# Patient Record
Sex: FEMALE | Race: WHITE | NOT HISPANIC OR LATINO | Employment: OTHER | URBAN - METROPOLITAN AREA
[De-identification: names, ages, dates, MRNs, and addresses within clinical notes are randomized per-mention and may not be internally consistent; named-entity substitution may affect disease eponyms.]

---

## 2018-02-12 ENCOUNTER — EVALUATION (OUTPATIENT)
Dept: PHYSICAL THERAPY | Facility: CLINIC | Age: 53
End: 2018-02-12
Payer: COMMERCIAL

## 2018-02-12 DIAGNOSIS — M54.41 ACUTE BACK PAIN WITH SCIATICA, RIGHT: Primary | ICD-10-CM

## 2018-02-12 PROCEDURE — G8978 MOBILITY CURRENT STATUS: HCPCS

## 2018-02-12 PROCEDURE — 97162 PT EVAL MOD COMPLEX 30 MIN: CPT

## 2018-02-12 PROCEDURE — G8979 MOBILITY GOAL STATUS: HCPCS

## 2018-02-12 NOTE — PROGRESS NOTES
PT Evaluation     Today's date: 2018  Patient name: Ricky Ye  : 1965  MRN: [id number]  Referring provider: Simón Garcia MD  Dx:   Encounter Diagnosis   Name Primary?  Acute back pain with sciatica, right Yes                  Assessment  Impairments: abnormal gait, abnormal or restricted ROM, activity intolerance, impaired physical strength, lacks appropriate home exercise program and pain with function    Assessment details: Pt presents with chronic pain Right UE/LE, upper/lower spine as well as feeling of inflammation face and right hand  Reports CT scan revealed degenerative changes L-spine/pelvis and that follow up MRI ordered  Reports symptoms worsen with activity  Poor sleep noted  Pt will benefit from PT tx to decrease symptoms and improve functional level  Prognosis: good    Goals  ST  Decrease pain 50% 4 wk  2  Increase trunk ROM by 10-15 degree  4 wk  3  Increase trunk strength to Fair+  4 wk  4  Increase BLE strength by 1/2 MMT grade 4 wk  LT  Pt will report no pain 8 wk  2  Increase trunk ROM to WNL 8 wk  3  Increase trunk strength to Good 8 wk  4  Pt will report no limitations with ADL's 8 wk  5  Pt will report no limitations with ambulation 8 wk    Plan  Patient would benefit from: PT eval  Planned modality interventions: thermotherapy: hydrocollator packs, ultrasound, unattended electrical stimulation and cryotherapy  Planned therapy interventions: manual therapy, strengthening, stretching, therapeutic exercise, home exercise program, functional ROM exercises and flexibility  Frequency: 3x week  Duration in weeks: 8  Treatment plan discussed with: patient and family        Subjective Evaluation    History of Present Illness  Mechanism of injury: Reports right sided symptoms for the past 3+ years  Had C spine fusion approx 1 5 years ago  Reports pain entire right side of body  Reports  symptoms UE/LE and right side of back    Reports sensation dulled right side  Reports face and right hand feel "hot/inflammed" every day  Reports using cold towels to help with this  Had CT scan of L-spine and pelvis  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 L-spine  Reports mm atrophy or right gluteal region  Reports poor sleep  Hx injections to L-spine 2 years ago  Pt is scheduled to see pain management again for possible injections  Primary complaint presently is LBP/Right hip pain  Pt  helped with HPI/PMH due to pt speaking limited English  Pain  Current pain ratin  At best pain ratin  At worst pain ratin  Quality: radiating, burning, sharp and dull ache  Relieving factors: ice and medications    Hand dominance: right      Diagnostic Tests  CT scan: abnormal        Objective     Tenderness     Right Hip   Tenderness in the PSIS  Additional Tenderness Details  Tender to palpation Right Lumbar paraspinals, Right SI jt region, Right gluteal and proximal aspects of right anterior/lateral hip  Active Range of Motion     Lumbar   Flexion: WFL  Extension: 10 degrees with pain  Left lateral flexion: 10 degrees with pain  Right lateral flexion: WFL  Left rotation: WF  Right rotation: [facility]    Additional Active Range of Motion Details  Pain Right Lumbar region with extension and right lateral flexion    Strength/Myotome Testing     Left Hip   Planes of Motion   Flexion: 5  Extension: 5  Abduction: 5  Adduction: 5    Right Hip   Planes of Motion   Flexion: 4-  Extension: 4  Abduction: 4  Adduction: 4    Left Knee   Flexion: 5  Extension: 5    Right Knee   Flexion: 4+  Extension: 4+    Left Ankle/Foot   Dorsiflexion: 5  Plantar flexion: 5    Right Ankle/Foot   Dorsiflexion: 5  Plantar flexion: 5    Additional Strength Details  Pain right hip/low back with all resisted right hip movements  Fair trunk strength with resisted seated movements    Ambulation     Observational Gait     Additional Observational Gait Details  No significant gait abnormalities noted    Reports increased right low back/hip pain the more she ambulates        Precautions    Specialty Daily Treatment Diary     Manual         Stretch BLE                                            Exercise Diary         nustep        St hip flex/abd/ext        PPT        abd crunch        bridges        LTR        SKTC        Add sq        t-band hip abd        Mini squat with PPT        LTP/MTP with PPT                                                                                    Modalities        IFC and MHP        Ultrasound

## 2018-02-12 NOTE — LETTER
2018    Kristin Zarate MD  [address] [place] [id number]    Patient: Nathaniel Sweeney   YOB: 1965   Date of Visit: 2018       Dear Dr Yang Lights:    Please review the attached Plan of Care from [address] [place] [address] recent visit  Please verify that you agree therapy should continue by signing the attached document and sending it back to our office  If you have any questions or concerns, please don't hesitate to call  Sincerely,    [facility], PT      Referring Provider:      I certify that I have read the below Plan of Care and certify the need for these services furnished under this plan of treatment while under my care  Kristin Zarate MD  [address] [place] [address]  VIA Facsimile: [phone]          PT Evaluation     Today's date: 2018  Patient name: Nathaniel Sweeney  : 1965  MRN: [id number]  Referring provider: Argelia Gaines MD  Dx:   Encounter Diagnosis   Name Primary?  Acute back pain with sciatica, right Yes                  Assessment  Impairments: abnormal gait, abnormal or restricted ROM, activity intolerance, impaired physical strength, lacks appropriate home exercise program and pain with function    Assessment details: Pt presents with chronic pain Right UE/LE, upper/lower spine as well as feeling of inflammation face and right hand  Reports CT scan revealed degenerative changes L-spine/pelvis and that follow up MRI ordered  Reports symptoms worsen with activity  Poor sleep noted  Pt will benefit from PT tx to decrease symptoms and improve functional level  Prognosis: good    Goals  ST  Decrease pain 50% 4 wk  2  Increase trunk ROM by 10-15 degree  4 wk  3  Increase trunk strength to Fair+  4 wk  4  Increase BLE strength by 1/2 MMT grade 4 wk  LT  Pt will report no pain 8 wk  2  Increase trunk ROM to WNL 8 wk  3  Increase trunk strength to Good 8 wk  4    Pt will report no limitations with ADL's 8 wk  5  Pt will report no limitations with ambulation 8 wk    Plan  Patient would benefit from: PT eval  Planned modality interventions: thermotherapy: hydrocollator packs, ultrasound, unattended electrical stimulation and cryotherapy  Planned therapy interventions: manual therapy, strengthening, stretching, therapeutic exercise, home exercise program, functional ROM exercises and flexibility  Frequency: 3x week  Duration in weeks: 8  Treatment plan discussed with: patient and family        Subjective Evaluation    History of Present Illness  Mechanism of injury: Reports right sided symptoms for the past 3+ years  Had C spine fusion approx 1 5 years ago  Reports pain entire right side of body  Reports  symptoms UE/LE and right side of back  Reports sensation dulled right side  Reports face and right hand feel "hot/inflammed" every day  Reports using cold towels to help with this  Had CT scan of L-spine and pelvis  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 L-spine  Reports mm atrophy or right gluteal region  Reports poor sleep  Hx injections to L-spine 2 years ago  Pt is scheduled to see pain management again for possible injections  Primary complaint presently is LBP/Right hip pain  Pt  helped with HPI/PMH due to pt speaking limited English  Pain  Current pain ratin  At best pain ratin  At worst pain ratin  Quality: radiating, burning, sharp and dull ache  Relieving factors: ice and medications    Hand dominance: right      Diagnostic Tests  CT scan: abnormal        Objective     Tenderness     Right Hip   Tenderness in the PSIS  Additional Tenderness Details  Tender to palpation Right Lumbar paraspinals, Right SI jt region, Right gluteal and proximal aspects of right anterior/lateral hip        Active Range of Motion     Lumbar   Flexion: WFL  Extension: 10 degrees with pain  Left lateral flexion: 10 degrees with pain  Right lateral flexion: WFL  Left rotation: [facility] rotation: [facility]    Additional Active Range of Motion Details  Pain Right Lumbar region with extension and right lateral flexion    Strength/Myotome Testing     Left Hip   Planes of Motion   Flexion: 5  Extension: 5  Abduction: 5  Adduction: 5    Right Hip   Planes of Motion   Flexion: 4-  Extension: 4  Abduction: 4  Adduction: 4    Left Knee   Flexion: 5  Extension: 5    Right Knee   Flexion: 4+  Extension: 4+    Left Ankle/Foot   Dorsiflexion: 5  Plantar flexion: 5    Right Ankle/Foot   Dorsiflexion: 5  Plantar flexion: 5    Additional Strength Details  Pain right hip/low back with all resisted right hip movements  Fair trunk strength with resisted seated movements    Ambulation     Observational Gait     Additional Observational Gait Details  No significant gait abnormalities noted  Reports increased right low back/hip pain the more she ambulates        Precautions    Specialty Daily Treatment Diary     Manual         Stretch BLE                                            Exercise Diary         nustep        St hip flex/abd/ext        PPT        abd crunch        bridges        LTR        SKTC        Add sq        t-band hip abd        Mini squat with PPT        LTP/MTP with PPT                                                                                    Modalities        IFC and MHP        Ultrasound

## 2018-02-13 ENCOUNTER — OFFICE VISIT (OUTPATIENT)
Dept: PHYSICAL THERAPY | Facility: CLINIC | Age: 53
End: 2018-02-13
Payer: COMMERCIAL

## 2018-02-13 ENCOUNTER — TRANSCRIBE ORDERS (OUTPATIENT)
Dept: PHYSICAL THERAPY | Facility: CLINIC | Age: 53
End: 2018-02-13

## 2018-02-13 DIAGNOSIS — M54.31 SCIATICA OF RIGHT SIDE: Primary | ICD-10-CM

## 2018-02-13 DIAGNOSIS — M54.41 ACUTE BACK PAIN WITH SCIATICA, RIGHT: Primary | ICD-10-CM

## 2018-02-13 PROCEDURE — 97014 ELECTRIC STIMULATION THERAPY: CPT

## 2018-02-13 PROCEDURE — 97110 THERAPEUTIC EXERCISES: CPT

## 2018-02-13 PROCEDURE — 97140 MANUAL THERAPY 1/> REGIONS: CPT

## 2018-02-13 NOTE — PROGRESS NOTES
Daily Note     Today's date: 2018  Patient name: Bijal Mitchell  : 1965  MRN: [id number]  Referring provider: Franchesca Hutson MD  Dx:   Encounter Diagnosis   Name Primary?  Acute back pain with sciatica, right Yes              Subjective:  I have pain from my LB to R hip and R leg  Objective: See treatment diary below    Assessment: Pt with good teddy to initiation of TE as per flow sheet  Pt did reports some LB and R hip discomfort with bridges  B LE tightness noted all planes and only able to teddy gentle stretch  Pt did report reduced pain at end with IFC/MHP to LB and R hip in L side lying  Plan: Continue per plan of care  and Progress treatment as tolerated        Precautions     Specialty Daily Treatment Diary      Manual   18           Stretch BLE  10"                               Exercise Diary   18           nustep  L 1 10'           TR/HR 1 x 10       St hip flex/abd/ext Juan R 1 x 10 ea           PPT 1 x 10           abd crunch  1 x 10           bridges  1 x 10           LTR  1 x 10           SKTC             Add sq  1 x 10           t-band hip abd  red 1 x 10           Mini squat with PPT  NV           LTP/MTP with PPT  NV                                            Modalities  18          IFC and MHP  15'           Ultrasound

## 2018-02-19 ENCOUNTER — OFFICE VISIT (OUTPATIENT)
Dept: PHYSICAL THERAPY | Facility: CLINIC | Age: 53
End: 2018-02-19
Payer: COMMERCIAL

## 2018-02-19 DIAGNOSIS — M54.41 ACUTE BACK PAIN WITH SCIATICA, RIGHT: Primary | ICD-10-CM

## 2018-02-19 PROCEDURE — 97014 ELECTRIC STIMULATION THERAPY: CPT | Performed by: PHYSICAL THERAPIST

## 2018-02-19 PROCEDURE — 97110 THERAPEUTIC EXERCISES: CPT

## 2018-02-19 PROCEDURE — 97140 MANUAL THERAPY 1/> REGIONS: CPT

## 2018-02-19 NOTE — PROGRESS NOTES
Daily Note     Today's date: 2018  Patient name: Cory Guillen  : 1965  MRN: [id number]  Referring provider: Brody Baldwin MD  Dx:   Encounter Diagnosis     ICD-10-CM    1  Acute back pain with sciatica, right M54 41                   Subjective: Patient reports "a little bit" of pain into the leg, but it is very minimal       Objective: See treatment diary below  Manual   18         Stretch BLE  10"  10'                             Exercise Diary   18         nustep  L 1 10' L1 10'          TR/HR 1 x 10  10x ea         St hip flex/abd/ext Juan R 1 x 10 ea B/L 10x ea          PPT 1 x 10 10x          abd crunch  1 x 10 10x         bridges  1 x 10 10x          LTR  1 x 10 10x          SKTC             Add sq  1 x 10 10x          t-band hip abd  red 1 x 10 Red 10x         Mini squat with PPT  NV 10x          LTP/MTP with PPT  NV  NV                                           Modalities  18         IFC and P  15'  15'         Ultrasound                   Assessment: Tolerated treatment well  Patient exhibited good technique with therapeutic exercises, would benefit from continued PT and no pain noted with exercises  Progress repititions next visit as tolerated by patient  Plan: Continue per plan of care

## 2018-02-20 ENCOUNTER — OFFICE VISIT (OUTPATIENT)
Dept: PHYSICAL THERAPY | Facility: CLINIC | Age: 53
End: 2018-02-20
Payer: COMMERCIAL

## 2018-02-20 DIAGNOSIS — M54.41 ACUTE BACK PAIN WITH SCIATICA, RIGHT: Primary | ICD-10-CM

## 2018-02-20 PROCEDURE — 97110 THERAPEUTIC EXERCISES: CPT

## 2018-02-20 PROCEDURE — 97140 MANUAL THERAPY 1/> REGIONS: CPT

## 2018-02-20 PROCEDURE — 97014 ELECTRIC STIMULATION THERAPY: CPT

## 2018-02-20 NOTE — PROGRESS NOTES
Daily Note     Today's date: 2018  Patient name: Alejandra Valenzuela  : 1965  MRN: [id number]  Referring provider: Praveen Michele MD  Dx:   Encounter Diagnosis     ICD-10-CM    1  Acute back pain with sciatica, right M54 41               Subjective: I was sore after last visit for a while  Better today  Objective: See treatment diary below      Assessment: Tolerated treatment well  She did report R lateral discomfort and clicking sensation with wt bearing ex and R piriformis stretch  She denied increased pain t/o Tx  Plan: Continue per plan of care  Progress treatment as tolerated        Manual   18       Stretch BLE  10"  10'  10'                           Exercise Diary   18       nustep  L 1 10' L1 10'   L 2 10'       TR/HR 1 x 10  10x ea  x 10       St hip flex/abd/ext Juan R 1 x 10 ea B/L 10x ea   BLE x 10 ea       PPT 1 x 10 10x   x 15       abd crunch  1 x 10 10x  x 15       bridges  1 x 10 10x   x 15       LTR  1 x 10 10x   x 15       SKTC             Add sq  1 x 10 10x   x 15       t-band hip abd  red 1 x 10 Red 10x Green x 15       Mini squat with PPT  NV 10x   x 10       LTP/MTP with PPT  NV  NV  NV                                         Modalities  18       IFC and MHP  15'  15'  15'       Ultrasound

## 2018-02-26 ENCOUNTER — OFFICE VISIT (OUTPATIENT)
Dept: PHYSICAL THERAPY | Facility: CLINIC | Age: 53
End: 2018-02-26
Payer: COMMERCIAL

## 2018-02-26 DIAGNOSIS — M54.41 ACUTE BACK PAIN WITH SCIATICA, RIGHT: Primary | ICD-10-CM

## 2018-02-26 PROCEDURE — 97110 THERAPEUTIC EXERCISES: CPT

## 2018-02-26 PROCEDURE — 97140 MANUAL THERAPY 1/> REGIONS: CPT

## 2018-02-26 PROCEDURE — 97014 ELECTRIC STIMULATION THERAPY: CPT

## 2018-02-26 NOTE — PROGRESS NOTES
Daily Note     Today's date: 2018  Patient name: Donaldo Ames  : 1965  MRN: [id number]  Referring provider: Jennifer Harris MD  Dx:   Encounter Diagnosis     ICD-10-CM    1  Acute back pain with sciatica, right M54 41                   Subjective: I'm feeling a little better      Objective: See treatment diary below  Pain 4/10 pre-tx  Pain 2/10 post tx  Assessment: Tolerated treatment well  Reports symptoms primarily right low back and lower extremity  Reports decreasing pain with PT tx  Patient would benefit from continued PT      Plan: Continue per plan of care        Precautions:    Specialty Daily Treatment Diary      Manual   18     Stretch BLE  10"  10'  10' 10'                         Exercise Diary   18     nustep  L 1 10' L1 10'   L 2 10' L3 10'     TR/HR 1 x 10  10x ea  x 10       St hip flex/abd/ext Juan R 1 x 10 ea B/L 10x ea   BLE x 10 ea       PPT 1 x 10 10x   x 15  20x     abd crunch  1 x 10 10x  x 15  20x     bridges  1 x 10 10x   x 15  20x     LTR  1 x 10 10x   x 15  20x     SKTC             Add sq  1 x 10 10x   x 15  20x     t-band hip abd  red 1 x 10 Red 10x Green x 15  blue 20x     Mini squat with PPT  NV 10x   x 10       LTP/MTP with PPT  NV  NV  NV                                         Modalities  18     IFC and MHP  15'  15'  15'  15'     Ultrasound

## 2018-02-27 ENCOUNTER — OFFICE VISIT (OUTPATIENT)
Dept: PHYSICAL THERAPY | Facility: CLINIC | Age: 53
End: 2018-02-27
Payer: COMMERCIAL

## 2018-02-27 DIAGNOSIS — M54.41 ACUTE BACK PAIN WITH SCIATICA, RIGHT: Primary | ICD-10-CM

## 2018-02-27 PROCEDURE — 97014 ELECTRIC STIMULATION THERAPY: CPT

## 2018-02-27 PROCEDURE — 97140 MANUAL THERAPY 1/> REGIONS: CPT

## 2018-02-27 PROCEDURE — 97110 THERAPEUTIC EXERCISES: CPT

## 2018-02-27 NOTE — PROGRESS NOTES
Daily Note     Today's date: 2018  Patient name: Tea Villalobos  : 1965  MRN: [id number]  Referring provider: Nolvia Cash MD  Dx:   Encounter Diagnosis     ICD-10-CM    1  Acute back pain with sciatica, right M54 41                   Subjective: The patient states that she is a little sore after the stretching yesterday  Objective: See treatment diary below      Assessment: Tolerated treatment well  Patient exhibited good technique with therapeutic exercises and would benefit from continued PT  The patient was tight in her B LE  Plan: Continue per plan of care       Precautions:     Specialty Daily Treatment Diary        Manual   18   Stretch BLE  10"  10'  10' 10'                         Exercise Diary   18  1-35-05  18  1-93-61 18   nustep  L 1 10' L1 10'   L 2 10' L3 10' L3 10'   TR/HR 1 x 10  10x ea  x 10   30x   St hip flex/abd/ext Juan R 1 x 10 ea B/L 10x ea   BLE x 10 ea   3x10    PPT 1 x 10 10x   x 15  20x  20x   abd crunch  1 x 10 10x  x 15  20x  20x   bridges  1 x 10 10x   x 15  20x  20x   LTR  1 x 10 10x   x 15  20x  20x   SKTC          20x   Add sq  1 x 10 10x   x 15  20x  :05 20x   t-band hip abd  red 1 x 10 Red 10x Green x 15  blue 20x  blue 20x   Mini squat with PPT  NV 10x   x 10       LTP/MTP with PPT  NV  NV  NV                                         Modalities  18   IFC and MHP  15'  15'  15'  15'  15'   Ultrasound

## 2018-03-12 ENCOUNTER — OFFICE VISIT (OUTPATIENT)
Dept: PHYSICAL THERAPY | Facility: CLINIC | Age: 53
End: 2018-03-12
Payer: COMMERCIAL

## 2018-03-12 DIAGNOSIS — M54.41 ACUTE BACK PAIN WITH SCIATICA, RIGHT: Primary | ICD-10-CM

## 2018-03-12 PROCEDURE — 97014 ELECTRIC STIMULATION THERAPY: CPT

## 2018-03-12 PROCEDURE — 97110 THERAPEUTIC EXERCISES: CPT

## 2018-03-12 PROCEDURE — 97140 MANUAL THERAPY 1/> REGIONS: CPT

## 2018-03-12 NOTE — PROGRESS NOTES
Daily Note     Today's date: 3/12/2018  Patient name: Belkys Mulligan  : 1965  MRN: [id number]  Referring provider: Shantell Salas MD  Dx:   Encounter Diagnosis     ICD-10-CM    1  Acute back pain with sciatica, right M54 41                   Subjective: The patient states that she is doing better  Objective: See treatment diary below      Assessment: Tolerated treatment well  Patient exhibited good technique with therapeutic exercises and would benefit from continued PT  The patient had good ROM in her B Le  Continue to work on increasing strength and ROM          Plan: Continue per plan of care     Precautions:     Specialty Daily Treatment Diary        Manual  3/12/18  2-19-18  2-20-18  2-26-18  2-27-18   Stretch BLE  15'  10'  10' 10'                         Exercise Diary  3/12/18  2-19-18  2-20-18  2-26-18 2/27/18   nustep  L 3 10' L1 10'   L 2 10' L3 10' L3 10'   TR/HR 3 x 10  10x ea  x 10   30x   St hip flex/abd/ext Juan R 3 x 10 B/L 10x ea   BLE x 10 ea   3x10    PPT 2 x 10 10x   x 15  20x  20x   abd crunch  2 x 10 10x  x 15  20x  20x   bridges  2 x 10 10x   x 15  20x  20x   LTR  2 x 10 10x   x 15  20x  20x   SKTC          20x   Add sq  2x 10 10x   x 15  20x  :05 20x   t-band hip abd  Blue 1 x 10 Red 10x Green x 15  blue 20x  blue 20x   Mini squat with PPT  NV 10x   x 10       LTP/MTP with PPT  NV  NV  NV                                         Modalities 3/12/18  2-19-18  2-20-18  2-26-18 2/27/18   IFC and MHP  15'  15'  15'  15'  15'   Ultrasound

## 2018-03-13 ENCOUNTER — OFFICE VISIT (OUTPATIENT)
Dept: PHYSICAL THERAPY | Facility: CLINIC | Age: 53
End: 2018-03-13
Payer: COMMERCIAL

## 2018-03-13 DIAGNOSIS — M54.41 ACUTE BACK PAIN WITH SCIATICA, RIGHT: Primary | ICD-10-CM

## 2018-03-13 PROCEDURE — 97140 MANUAL THERAPY 1/> REGIONS: CPT

## 2018-03-13 PROCEDURE — 97110 THERAPEUTIC EXERCISES: CPT

## 2018-03-13 PROCEDURE — 97014 ELECTRIC STIMULATION THERAPY: CPT

## 2018-03-13 NOTE — PROGRESS NOTES
Daily Note     Today's date: 3/13/2018  Patient name: Alonso Gar  : 1965  MRN: [id number]  Referring provider: Patricia García MD  Dx:   Encounter Diagnosis     ICD-10-CM    1  Acute back pain with sciatica, right M54 41                   Subjective: Patient reports that she is doing alright today  Objective: See treatment diary below      Assessment: Tolerated treatment well  Patient exhibited good technique with therapeutic exercises and would benefit from continued PT  The patient did well with resistive exercises  Patient educated in proper self stretches  Plan: Continue per plan of care       Precautions:     Specialty Daily Treatment Diary        Manual  3/12/18 3/13/18      Stretch BLE  15'  10'                          Exercise Diary  3/12/18 3/13/18      nustep  L 3 10' L3 10'       TR/HR 3 x 10       St hip flex/abd/ext Juan R 3 x 10       PPT 2 x 10 10x       abd crunch  2 x 10 10x      bridges  2 x 10 10x       LTR  2 x 10 10x       SKTC          Add sq  2x 10 10x       t-band hip abd  Blue 1 x 10 Blue 20x      Mini squat with PPT  NV 10x       LTP/MTP with PPT  NV  NV      Leg Press   30# 20x         Leg Extension  15# 20x      Piriformis Stretch  :20 x 5      Hamstring Stretch  :20 x 5      Leg Curl   20# 20x               Modalities 3/12/18 3/13/18      IFC and MHP  15'  15'      Ultrasound

## 2018-03-19 ENCOUNTER — OFFICE VISIT (OUTPATIENT)
Dept: PHYSICAL THERAPY | Facility: CLINIC | Age: 53
End: 2018-03-19
Payer: COMMERCIAL

## 2018-03-19 DIAGNOSIS — M54.41 ACUTE BACK PAIN WITH SCIATICA, RIGHT: Primary | ICD-10-CM

## 2018-03-19 PROCEDURE — 97014 ELECTRIC STIMULATION THERAPY: CPT

## 2018-03-19 PROCEDURE — 97110 THERAPEUTIC EXERCISES: CPT

## 2018-03-19 PROCEDURE — 97164 PT RE-EVAL EST PLAN CARE: CPT

## 2018-03-19 PROCEDURE — 97140 MANUAL THERAPY 1/> REGIONS: CPT

## 2018-03-19 PROCEDURE — G8990 OTHER PT/OT CURRENT STATUS: HCPCS

## 2018-03-19 PROCEDURE — G8991 OTHER PT/OT GOAL STATUS: HCPCS

## 2018-03-19 NOTE — PROGRESS NOTES
PT Re-Evaluation     Today's date: 3/19/2018  Patient name: Ankit Cope  : 1965  MRN: [id number]  Referring provider: Stefanie Magdaleno MD  Dx:   Encounter Diagnosis   Name Primary?  Acute back pain with sciatica, right Yes                  Assessment  Impairments: abnormal gait, abnormal or restricted ROM, activity intolerance, impaired physical strength, lacks appropriate home exercise program and pain with function    Assessment details: Pt presented with chronic pain Right UE/LE, upper/lower spine as well as feeling of inflammation face and right hand  She reports feeling improved with PT tx however symptoms do continue  She reports continue symptoms right side of body, head/face RUE/RLE  Reports decreased overall symptoms in right low back/RLE  PT notes improved trunk ROM and trunk/LE strength  Reports pain after prolonged sitting or prolonged activity  Pt is to see Ortho MD for further evaluation  Pt will benefit from PT tx to decrease symptoms and improve functional level  Prognosis: good    Goals  ST  Decrease pain 50% 4 wk  2  Increase trunk ROM by 10-15 degree  4 wk  3  Increase trunk strength to Fair+  4 wk  4  Increase BLE strength by 1/2 MMT grade 4 wk  LT  Pt will report no pain 8 wk  2  Increase trunk ROM to WNL 8 wk  3  Increase trunk strength to Good 8 wk  4  Pt will report no limitations with ADL's 8 wk  5    Pt will report no limitations with ambulation 8 wk    Plan  Patient would benefit from: PT eval  Planned modality interventions: thermotherapy: hydrocollator packs, ultrasound, unattended electrical stimulation and cryotherapy  Planned therapy interventions: manual therapy, strengthening, stretching, therapeutic exercise, home exercise program, functional ROM exercises and flexibility  Frequency: 3x week  Duration in weeks: 8  Treatment plan discussed with: patient and family        Subjective Evaluation    History of Present Illness  Mechanism of injury: Reports right sided symptoms for the past 3+ years  Had C spine fusion approx 1 5 years ago  Reports pain entire right side of body  Reports  symptoms UE/LE and right side of back  Reports sensation dulled right side  Reports face and right hand feel "hot/inflammed" every day  Reports using cold towels to help with this  Had CT scan of L-spine and pelvis  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 L-spine  Reports mm atrophy or right gluteal region  Reports poor sleep  Hx injections to L-spine 2 years ago  Pt is scheduled to see pain management again for possible injections  Primary complaint presently is LBP/Right hip pain  Pt  helped with HPI/PMH due to pt speaking limited English  Pain  Current pain ratin  At best pain ratin  At worst pain rating: 3  Quality: radiating, burning, sharp and dull ache  Relieving factors: ice and medications  Aggravating factors: sitting and walking    Hand dominance: right      Diagnostic Tests  CT scan: abnormal        Objective     Tenderness     Right Hip   Tenderness in the PSIS  Additional Tenderness Details  Continued tenderness to palpation Right Lumbar paraspinals, Right SI jt region, Right gluteal and proximal aspects of right anterior/lateral hip        Active Range of Motion     Lumbar   Flexion: WFL  Extension: WFL  Left lateral flexion: 10 degrees   Right lateral flexion: WFL and with pain  Left rotation: WFL  Right rotation: [facility]    Additional Active Range of Motion Details  Pain Right Lumbar region with right lateral flexion  Reports tightness with trunk ROM    Strength/Myotome Testing     Left Hip   Planes of Motion   Flexion: 5  Extension: 5  Abduction: 5  Adduction: 5    Right Hip   Planes of Motion   Flexion: 4+  Extension: 4+  Abduction: 4+  Adduction: 4+    Left Knee   Flexion: 5  Extension: 5    Right Knee   Flexion: 5  Extension: 5    Left Ankle/Foot   Dorsiflexion: 5  Plantar flexion: 5    Right Ankle/Foot   Dorsiflexion: 5  Plantar flexion: 5    Additional Strength Details  Fair+ trunk strength with resisted seated movements     Tests     Lumbar     Right   Positive passive SLR  Ambulation     Observational Gait     Additional Observational Gait Details  No significant gait abnormalities noted  Reports increased right low back/hip pain the more she ambulates        Precautions:     Specialty Daily Treatment Diary        Manual  3/12/18 3/13/18 3-19-18     Stretch BLE  15'  10' 10'                         Exercise Diary  3/12/18 3/13/18 3-19-18     nustep  L 3 10' L3 10'  L3 12'     TR/HR 3 x 10       St hip flex/abd/ext Juan R 3 x 10       PPT 2 x 10 10x  20x     abd crunch  2 x 10 10x 20x     bridges  2 x 10 10x  20x     LTR  2 x 10 10x  20x     SKTC          Add sq  2x 10 10x  20x     t-band hip abd  Blue 1 x 10 Blue 20x Blue 20x     Mini squat with PPT  NV 10x       LTP/MTP with PPT  NV  NV      Leg Press   30# 20x         Leg Extension  15# 20x      Piriformis Stretch  :20 x 5      Hamstring Stretch  :20 x 5      Leg Curl   20# 20x               Modalities 3/12/18 3/13/18 3-19-18     IFC and MHP  15'  15' 15' with CP     Ultrasound

## 2018-03-19 NOTE — LETTER
2018    Vashti Carmen MD  [address] [place] [address]    Patient: Nataliya Tucker   YOB: 1965   Date of Visit: 3/19/2018     Encounter Diagnosis     ICD-10-CM    1  Acute back pain with sciatica, right M54 41        Dear Dr Noel Lesch:    Please review the attached Plan of Care from [address] [place] [address] recent visit  Please verify that you agree therapy should continue by signing the attached document and sending it back to our office  If you have any questions or concerns, please don't hesitate to call  Sincerely,    Eris Lovett, PT      Referring Provider:      I certify that I have read the below Plan of Care and certify the need for these services furnished under this plan of treatment while under my care  Vashti Carmen MD  [address] [facility] [place] [address]  VIA Facsimile: [phone]          PT Re-Evaluation     Today's date: 3/19/2018  Patient name: Nataliya Tucker  : 1965  MRN: [id number]  Referring provider: Huber Alcala MD  Dx:   Encounter Diagnosis   Name Primary?  Acute back pain with sciatica, right Yes                  Assessment  Impairments: abnormal gait, abnormal or restricted ROM, activity intolerance, impaired physical strength, lacks appropriate home exercise program and pain with function    Assessment details: Pt presented with chronic pain Right UE/LE, upper/lower spine as well as feeling of inflammation face and right hand  She reports feeling improved with PT tx however symptoms do continue  She reports continue symptoms right side of body, head/face RUE/RLE  Reports decreased overall symptoms in right low back/RLE  PT notes improved trunk ROM and trunk/LE strength  Reports pain after prolonged sitting or prolonged activity  Pt is to see Ortho MD for further evaluation  Pt will benefit from PT tx to decrease symptoms and improve functional level  Prognosis: good    Goals  ST    Decrease pain 50% 4 wk  2  Increase trunk ROM by 10-15 degree  4 wk  3  Increase trunk strength to Fair+  4 wk  4  Increase BLE strength by 1/2 MMT grade 4 wk  LT  Pt will report no pain 8 wk  2  Increase trunk ROM to WNL 8 wk  3  Increase trunk strength to Good 8 wk  4  Pt will report no limitations with ADL's 8 wk  5  Pt will report no limitations with ambulation 8 wk    Plan  Patient would benefit from: PT eval  Planned modality interventions: thermotherapy: hydrocollator packs, ultrasound, unattended electrical stimulation and cryotherapy  Planned therapy interventions: manual therapy, strengthening, stretching, therapeutic exercise, home exercise program, functional ROM exercises and flexibility  Frequency: 3x week  Duration in weeks: 8  Treatment plan discussed with: patient and family        Subjective Evaluation    History of Present Illness  Mechanism of injury: Reports right sided symptoms for the past 3+ years  Had C spine fusion approx 1 5 years ago  Reports pain entire right side of body  Reports  symptoms UE/LE and right side of back  Reports sensation dulled right side  Reports face and right hand feel "hot/inflammed" every day  Reports using cold towels to help with this  Had CT scan of L-spine and pelvis  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 L-spine  Reports mm atrophy or right gluteal region  Reports poor sleep  Hx injections to L-spine 2 years ago  Pt is scheduled to see pain management again for possible injections  Primary complaint presently is LBP/Right hip pain  Pt  helped with HPI/PMH due to pt speaking limited English  Pain  Current pain ratin  At best pain ratin  At worst pain rating: 3  Quality: radiating, burning, sharp and dull ache  Relieving factors: ice and medications  Aggravating factors: sitting and walking    Hand dominance: right      Diagnostic Tests  CT scan: abnormal        Objective     Tenderness     Right Hip   Tenderness in the PSIS       Additional Tenderness Details  Continued tenderness to palpation Right Lumbar paraspinals, Right SI jt region, Right gluteal and proximal aspects of right anterior/lateral hip  Active Range of Motion     Lumbar   Flexion: WFL  Extension: WFL  Left lateral flexion: 10 degrees   Right lateral flexion: WFL and with pain  Left rotation: WFL  Right rotation: [facility]    Additional Active Range of Motion Details  Pain Right Lumbar region with right lateral flexion  Reports tightness with trunk ROM    Strength/Myotome Testing     Left Hip   Planes of Motion   Flexion: 5  Extension: 5  Abduction: 5  Adduction: 5    Right Hip   Planes of Motion   Flexion: 4+  Extension: 4+  Abduction: 4+  Adduction: 4+    Left Knee   Flexion: 5  Extension: 5    Right Knee   Flexion: 5  Extension: 5    Left Ankle/Foot   Dorsiflexion: 5  Plantar flexion: 5    Right Ankle/Foot   Dorsiflexion: 5  Plantar flexion: 5    Additional Strength Details  Fair+ trunk strength with resisted seated movements     Tests     Lumbar     Right   Positive passive SLR  Ambulation     Observational Gait     Additional Observational Gait Details  No significant gait abnormalities noted  Reports increased right low back/hip pain the more she ambulates        Precautions:     Specialty Daily Treatment Diary        Manual  3/12/18 3/13/18 3-19-18     Stretch BLE  15'  10' 10'                         Exercise Diary  3/12/18 3/13/18 3-19-18     nustep  L 3 10' L3 10'  L3 12'     TR/HR 3 x 10       St hip flex/abd/ext Juan R 3 x 10       PPT 2 x 10 10x  20x     abd crunch  2 x 10 10x 20x     bridges  2 x 10 10x  20x     LTR  2 x 10 10x  20x     SKTC          Add sq  2x 10 10x  20x     t-band hip abd  Blue 1 x 10 Blue 20x Blue 20x     Mini squat with PPT  NV 10x       LTP/MTP with PPT  NV  NV      Leg Press   30# 20x         Leg Extension  15# 20x      Piriformis Stretch  :20 x 5      Hamstring Stretch  :20 x 5      Leg Curl   20# 20x               Modalities 3/12/18 3/13/18 3-19-18     IFC and P  15'  15' 15' with CP     Ultrasound

## 2018-03-20 ENCOUNTER — TRANSCRIBE ORDERS (OUTPATIENT)
Dept: PHYSICAL THERAPY | Facility: CLINIC | Age: 53
End: 2018-03-20

## 2018-03-20 ENCOUNTER — OFFICE VISIT (OUTPATIENT)
Dept: PHYSICAL THERAPY | Facility: CLINIC | Age: 53
End: 2018-03-20
Payer: COMMERCIAL

## 2018-03-20 DIAGNOSIS — M54.41 ACUTE BACK PAIN WITH SCIATICA, RIGHT: Primary | ICD-10-CM

## 2018-03-20 PROCEDURE — 97110 THERAPEUTIC EXERCISES: CPT

## 2018-03-20 PROCEDURE — 97014 ELECTRIC STIMULATION THERAPY: CPT

## 2018-03-20 PROCEDURE — 97140 MANUAL THERAPY 1/> REGIONS: CPT

## 2018-03-20 NOTE — PROGRESS NOTES
Daily Note     Today's date: 3/20/2018  Patient name: Alesha Carr  : 1965  MRN: [id number]  Referring provider: Shannan Mendez MD  Dx:   Encounter Diagnosis     ICD-10-CM    1  Acute back pain with sciatica, right M54 41               Subjective:  I still have the pain at my R LB and RLE  Objective: See treatment diary below    Assessment: Tolerated treatment well  Patient was able to resume ex on machines and advance with bridge/add squeeze combo with good teddy  Pt reports feeling well t/o ex, MT and at end of session  Remains tight BLE    exhibited good technique with therapeutic exercises and would benefit from continued PT    Plan: Continue per plan of care       Precautions:     Specialty Daily Treatment Diary        Manual  3/12/18 3/13/18 3-19-18  3-20-18     Stretch BLE  15'  10' 10'  10'                         Exercise Diary  3/12/18 3/13/18 3-19-18  3-20-18     nustep  L 3 10' L3 10'  L3 12'  L 3  12"     PPT 2 x 10 10x  20x  20x     abd crunch  2 x 10 10x 20x  20x     Bridges w/squeeze  2 x 10 10x  20x  20x     LTR  2 x 10 10x  20x  20x     Add sq  2x 10 10x  20x  20     t-band hip abd  Blue 1 x 10 Blue 20x Blue 20x Blue 20x     Mini squat with PPT  NV 10x     10x     LTP/MTP with PPT  NV  NV         Leg Press   30# 20x   30#  30x     Leg Extension   15# 20x   15#  20x     Piriformis Stretch   :20 x 5    20" x 5     Hamstring Stretch   :20 x 5    20" x 5     Leg Curl   20# 20x    20# 20x           Modalities 3/12/18 3/13/18 3-19-18  3-20-18     IFC and MHP  15'  15' 15' with CP  15' with CP     Ultrasound

## 2018-04-18 NOTE — PROGRESS NOTES
PT Discharge    Today's date: 2018  Patient name: Gloria Corona  : 1965  MRN: [id number]  Referring provider: Zheng Mckenzie MD  Dx:   Encounter Diagnosis   Name Primary?  Acute back pain with sciatica, right Yes       Start Time: 1200  Stop Time: 1300  Total time in clinic (min): 60 minutes    Assessment  Impairments: abnormal gait, abnormal or restricted ROM, activity intolerance, impaired physical strength, lacks appropriate home exercise program and pain with function    Assessment details: Pt presented with chronic pain Right UE/LE, upper/lower spine as well as feeling of inflammation face and right hand  She reports feeling improved with PT tx however symptoms do continue  She reports continue symptoms right side of body, head/face RUE/RLE  Reports decreased overall symptoms in right low back/RLE  PT notes improved trunk ROM and trunk/LE strength  Reports pain after prolonged sitting or prolonged activity  Pt last attended session was on 3-20-18   9 total PT sessions attended  She did not return after this time  D/C PT services     Prognosis: good    Goals  ST  Decrease pain 50% 4 wk  2  Increase trunk ROM by 10-15 degree  4 wk  3  Increase trunk strength to Fair+  4 wk  4  Increase BLE strength by 1/2 MMT grade 4 wk  LT  Pt will report no pain 8 wk  2  Increase trunk ROM to WNL 8 wk  3  Increase trunk strength to Good 8 wk  4  Pt will report no limitations with ADL's 8 wk  5    Pt will report no limitations with ambulation 8 wk    Plan  Patient would benefit from: PT eval  Planned modality interventions: thermotherapy: hydrocollator packs, ultrasound, unattended electrical stimulation and cryotherapy  Planned therapy interventions: manual therapy, strengthening, stretching, therapeutic exercise, home exercise program, functional ROM exercises and flexibility  Frequency: 3x week  Duration in weeks: 8  Treatment plan discussed with: patient and family  Plan details: D/C PT services  Findings per last re-evaluation  Subjective Evaluation    History of Present Illness  Mechanism of injury: Reports right sided symptoms for the past 3+ years  Had C spine fusion approx 1 5 years ago  Reports pain entire right side of body  Reports  symptoms UE/LE and right side of back  Reports sensation dulled right side  Reports face and right hand feel "hot/inflammed" every day  Reports using cold towels to help with this  Had CT scan of L-spine and pelvis  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 L-spine  Reports mm atrophy or right gluteal region  Reports poor sleep  Hx injections to L-spine 2 years ago  Pt is scheduled to see pain management again for possible injections  Primary complaint presently is LBP/Right hip pain  Pt  helped with HPI/PMH due to pt speaking limited English  Pain  Current pain ratin  At best pain ratin  At worst pain rating: 3  Quality: radiating, burning, sharp and dull ache  Relieving factors: ice and medications  Aggravating factors: sitting and walking    Hand dominance: right      Diagnostic Tests  CT scan: abnormal        Objective     Tenderness     Right Hip   Tenderness in the PSIS  Additional Tenderness Details  Continued tenderness to palpation Right Lumbar paraspinals, Right SI jt region, Right gluteal and proximal aspects of right anterior/lateral hip        Active Range of Motion     Lumbar   Flexion: WFL  Extension: WFL  Left lateral flexion: 10 degrees   Right lateral flexion: WFL and with pain  Left rotation: WFL  Right rotation: [facility]    Additional Active Range of Motion Details  Pain Right Lumbar region with right lateral flexion  Reports tightness with trunk ROM    Strength/Myotome Testing     Left Hip   Planes of Motion   Flexion: 5  Extension: 5  Abduction: 5  Adduction: 5    Right Hip   Planes of Motion   Flexion: 4+  Extension: 4+  Abduction: 4+  Adduction: 4+    Left Knee   Flexion: 5  Extension: 5    Right Knee   Flexion: 5  Extension: 5    Left Ankle/Foot   Dorsiflexion: 5  Plantar flexion: 5    Right Ankle/Foot   Dorsiflexion: 5  Plantar flexion: 5    Additional Strength Details  Fair+ trunk strength with resisted seated movements     Tests     Lumbar     Right   Positive passive SLR  Ambulation     Observational Gait     Additional Observational Gait Details  No significant gait abnormalities noted  Reports increased right low back/hip pain the more she ambulates

## 2025-01-06 ENCOUNTER — EVALUATION (OUTPATIENT)
Dept: PHYSICAL THERAPY | Facility: CLINIC | Age: 60
End: 2025-01-06
Payer: COMMERCIAL

## 2025-01-06 DIAGNOSIS — M54.16 LUMBAR RADICULOPATHY: ICD-10-CM

## 2025-01-06 DIAGNOSIS — Z98.1 S/P CERVICAL SPINAL FUSION: Primary | ICD-10-CM

## 2025-01-06 PROCEDURE — 97162 PT EVAL MOD COMPLEX 30 MIN: CPT

## 2025-01-06 PROCEDURE — 97110 THERAPEUTIC EXERCISES: CPT

## 2025-01-06 NOTE — PROGRESS NOTES
PT Evaluation     Today's date: 2025  Patient name: Merlyn Velasquez  : 1965  MRN: [id number]  Referring provider: Lizbeth Zamarripa PA-C  Dx:   Encounter Diagnosis     ICD-10-CM    1. S/P cervical spinal fusion  Z98.1       2. Lumbar radiculopathy  M54.16                      Assessment  Impairments: abnormal or restricted ROM, impaired physical strength, lacks appropriate home exercise program, pain with function, poor posture , poor body mechanics, activity limitations and endurance  Symptom irritability: moderate    Assessment details: Patient is a 59 year old female presenting to this facility with complaints of pain in her whole body, but her neck and back in particular. She has a hx of ACDF on 8- for cervical myelopathy and patient reports improvements initially in symptoms however now they continue to worsen. She also has hx of injections in her back with initial improvements but then returning of sx. Symptoms of aches, sharp pain, numbness, and tingling vary in intensity and frequency in her neck, low back, UE and LE, R>L. Upon evaluation, patient demonstrated good c/s,  UE and LE strength, increased tightness in LE posterior chain musculature, TTP to c/s and l/s psp, pain with MMT. Increased difficulty with supine to sit transfer with poor mechanics. Fwd head and rounded shoulders present. Patient was provided with HEP to begin targeting deficits noted above. Pt reports of right sided weakness appear subjective. Patient would benefit from skilled physical therapy to address deficits in ROM, posture, improve strength in order to decrease pain, maximize functional mobility, and to maximize independence with ADLs.   Understanding of Dx/Px/POC: good     Prognosis: good    Goals  ST.  Decreased pain 50% 6 wk 2.  Increase c-spine ROM to minimal limitations 6wk  3.  Increase bilateral UE strength to 5/5 6wk  6.  Pt will report no difficulty with light ADL's 6 wk    LT.  Pt will report no  pain 12 wk  2.  Pt will report no headaches 12 wk  3.  Increase c-spine AROM to WNL 12 wk  4.  Increase c-spine strength to WNL 12 wk  5.  Pt will report no limitations with ADL's 12 wk      ST.  Decrease pain 50% 6 wk  2.  Increase trunk ROM to minimal 6 wk  3.  Increase trunk strength to good -  6 wk  4.  Increase BLE strength to 4+/5 6 wk  LT.  Pt will report no pain 12 wk  2.  Increase trunk ROM to WNL 12 wk  3.  Increase trunk strength to  12 wk  4.  Pt will report no limitations with ADL's 12 wk  5.  Pt will report no limitations with ambulation 12 wk      Plan  Patient would benefit from: PT eval and skilled physical therapy  Planned modality interventions: cryotherapy and thermotherapy: hydrocollator packs    Planned therapy interventions: abdominal trunk stabilization, ADL training, body mechanics training, flexibility, functional ROM exercises, graded exercise, home exercise program, manual therapy, neuromuscular re-education, patient/caregiver education, postural training, self care, strengthening, stretching, therapeutic activities and therapeutic exercise    Frequency: 2-3x week  Duration in weeks: 12  Treatment plan discussed with: patient      Subjective Evaluation    History of Present Illness  Date of surgery: 8/15/2016  Mechanism of injury: surgery  Mechanism of injury: Patient is a 59 year old female presenting to this facility with current complaints of neck and low back pain. She had a C5-6 ACDF ().  She reports that she started losing muscle on the right side of her body many years ago which seems to have progressively worsened in the last 3-4 years. She also reports numbness on the entire right side of the body. Her entire right side feels that is has gotten progressively weaker compared to the left. She reports symptoms on the left side but much worse on R side.She denies any dysphagia. She had a couple falls in the last year on uneven surfaces but otherwise denies any  "significant gait instability.She denies recent changes to bowel or bladder habits. During her last neuro visit, the summary mentioned \"overall suspect that her current, largely subjective, symptoms are related to her known cervical myelopathy preceding decompression. There is no evidence on exam to suggest progressive myelopathy.\"  She is also experiencing pain involving her lower back with radiation into her right greater than left leg laterally. Overall, her neck and shoulder pain is the most bothersome. In 2016 she underwent the previously mentioned ACDF with some relief of her pain. However, over time her pain continued to progress. She experiences worsening pain with lifting her arms above her head. She feels her entire right side of her body is weak. Reaching behind her back is hard and painful. She did have injections in her low back in the past with little relief but the pain came back. She has not had any recent imaging. She has been to this facility in the past for her neck and back, it helped a little bit.  She does have an MRI scheduled toward the end of January. She was given medications for the pain and she took some yesterday with little relief in her pain. She often wears heeled sneakers as these help her pain. She notes pain in her whole body. She cleans her house, cooks everything. She is limited at times with these activities due to her pain. Overall she would like to get ann marie and decrease her pain best she can.   Quality of life: fair    Patient Goals  Patient goals for therapy: decreased pain, increased motion, increased strength, independence with ADLs/IADLs and return to sport/leisure activities  Patient goal: \"I need a lot of stretching and movement for the pain\"  Pain  Current pain ratin  At best pain ratin  At worst pain ratin  Location: both side of neck, shoulder, and low back  Quality: dull ache and sharp  Relieving factors: heat and medications  Exacerbated by: a lot of " activitiy and bending down, showering.  Progression: worsening    Social Support  Steps to enter house: no  Stairs in house: no     Exercise history: none      Diagnostic Tests    FCE comments: Abnormal MRI and x-ray in past. Future MRI in January      Objective     Concurrent Complaints  Positive for headaches (sometimes). Negative for tinnitus and visual change    Palpation   Left   No palpable tenderness to the quadratus lumborum and suboccipitals.   Tenderness of the erector spinae, levator scapulae, middle trapezius, rhomboids and upper trapezius.   Trigger point to rhomboids.     Right   No palpable tenderness to the quadratus lumborum and suboccipitals.   Tenderness of the erector spinae, levator scapulae, middle trapezius, rhomboids and upper trapezius.   Trigger point to rhomboids.     Tenderness     Lumbar Spine  No tenderness in the spinous process.     Left Hip   No tenderness in the PSIS.     Right Hip   No tenderness in the PSIS.     Neurological Testing     Sensation     Lumbar   Left   Intact: light touch    Right   Paresthesia: light touch    Reflexes   Left   Clonus sign: negative    Right   Clonus sign: negative    Additional Neurological Details  Pt reports slight difference in light touch exam, she can feel more in her L UE and LE    Active Range of Motion   Cervical/Thoracic Spine       Cervical    Flexion:  WFL  Extension: 40 degrees     with pain  Left lateral flexion: 25 degrees      Right lateral flexion: 25 degrees      Left rotation: 42 degrees  Right rotation: 30 degrees       Thoracic    Flexion:  Restriction level: moderate  Extension:  Restriction level: moderate  Left lateral flexion:  with pain Restriction level: minimal  Right lateral flexion:  with pain Restriction level: minimal  Left rotation:  Restriction level: moderate  Right rotation:  with pain Restriction level: moderate  Left Shoulder   Flexion: WFL  Abduction: WFL  External rotation BTH: C7   Internal rotation BTB: T12      Right Shoulder   Flexion: 170 degrees   Abduction: 170 degrees   External rotation BTH: C2   Internal rotation BTB: T12     Strength/Myotome Testing   Cervical Spine   Neck extension: 4+  Neck flexion: 4+    Left   Neck lateral flexion (C3): 4+    Right   Neck lateral flexion (C3): 4+    Left Shoulder     Planes of Motion   Flexion: 5   Extension: 5   Abduction: 5   Adduction: 5   External rotation at 0°: 4+   Internal rotation at 0°: 5     Right Shoulder     Planes of Motion   Flexion: 5   Extension: 5   Abduction: 5   Adduction: 5   External rotation at 0°: 4+   Internal rotation at 0°: 5     Left Elbow   Flexion: 5  Extension: 5    Right Elbow   Flexion: 5  Extension: 5    Left Hip   Planes of Motion   Flexion: 4+  Extension: 5  Abduction: 4+  Adduction: 5    Right Hip   Planes of Motion   Flexion: 4+  Extension: 5  Abduction: 4+  Adduction: 5    Left Knee   Flexion: 5  Extension: 5    Right Knee   Flexion: 5  Extension: 5    Left Ankle/Foot   Dorsiflexion: 5  Plantar flexion: 5    Right Ankle/Foot   Dorsiflexion: 5  Plantar flexion: 5    Additional Strength Details  Good  strength bilaterally     Muscle Activation   Patient able to activate left transverse abdominals and right transverse abdominals.     Additional Muscle Activation Details  With increased cues     Tests     Lumbar     Left   Negative passive SLR.     Right   Negative passive SLR.     Left Pelvic Girdle/Sacrum   Negative: active SLR test.     Right Pelvic Girdle/Sacrum   Negative: active SLR test.     Left Hip   Negative long sit.     Right Hip   Negative long sit.     Additional Tests Details  Tightness in posterior chain, L>R with passive SLR     Pt able to complete PPT and LTR with cues. Tightness reported in low back with LTR, no pain  Neuro Exam:     Headaches   Patient reports headaches: Yes (sometimes).     Sensation   Light touch LE: left WNL and right WNL              Precautions: Hx cervical ACDF 8-, hx cervical  myelopathy   HEP: [id number]        Manuals PT Eval 1-6-25                                       Neuro Re-Ed         Scap squeeze        Foam roll up wall        Thoracic ext over roll        LTR        PPT        Abd pball iso        Ball roll out         Ther Ex        Nustep        MTP/LTP        Banded pull apart        Hamstring stretch         HR/TR                                        Pt Edu Patient issued HEP to begin targeting deficits found on eval       Ther Activity                        Gait Training                        Modalities        MHP/CP

## 2025-01-06 NOTE — LETTER
2025    Ankit Ramos MD  [address] [facility]  [place] [address]    Patient: Merlyn Velasquez   YOB: 1965   Date of Visit: 2025     Encounter Diagnosis     ICD-10-CM    1. S/P cervical spinal fusion  Z98.1       2. Lumbar radiculopathy  M54.16           Dear Dr. Ramos:    Thank you for your recent referral of Merlyn Velasquez. Please review the attached evaluation summary from Merlyn's recent visit.     Please verify that you agree with the plan of care by signing the attached order.     If you have any questions or concerns, please do not hesitate to call.     I sincerely appreciate the opportunity to share in the care of one of your patients and hope to have another opportunity to work with you in the near future.       Sincerely,    Sharona Peter, PT      Referring Provider:      I certify that I have read the below Plan of Care and certify the need for these services furnished under this plan of treatment while under my care.                    Ankit Ramos MD  [address] [facility] [place] [address]  Via Fax: [phone]          PT Evaluation     Today's date: 2025  Patient name: Merlyn Velasquez  : 1965  MRN: [id number]  Referring provider: Lizbeth Zamarripa PA-C  Dx:   Encounter Diagnosis     ICD-10-CM    1. S/P cervical spinal fusion  Z98.1       2. Lumbar radiculopathy  M54.16                      Assessment  Impairments: abnormal or restricted ROM, impaired physical strength, lacks appropriate home exercise program, pain with function, poor posture , poor body mechanics, activity limitations and endurance  Symptom irritability: moderate    Assessment details: Patient is a 59 year old female presenting to this facility with complaints of pain in her whole body, but her neck and back in particular. She has a hx of ACDF on 8- for cervical myelopathy and patient reports improvements initially in symptoms however now they continue to worsen. She also  has hx of injections in her back with initial improvements but then returning of sx. Symptoms of aches, sharp pain, numbness, and tingling vary in intensity and frequency in her neck, low back, UE and LE, R>L. Upon evaluation, patient demonstrated good c/s,  UE and LE strength, increased tightness in LE posterior chain musculature, TTP to c/s and l/s psp, pain with MMT. Increased difficulty with supine to sit transfer with poor mechanics. Fwd head and rounded shoulders present. Patient was provided with HEP to begin targeting deficits noted above. Pt reports of right sided weakness appear subjective. Patient would benefit from skilled physical therapy to address deficits in ROM, posture, improve strength in order to decrease pain, maximize functional mobility, and to maximize independence with ADLs.   Understanding of Dx/Px/POC: good     Prognosis: good    Goals  ST.  Decreased pain 50% 6 wk 2.  Increase c-spine ROM to minimal limitations 6wk  3.  Increase bilateral UE strength to 5/5 6wk  6.  Pt will report no difficulty with light ADL's 6 wk    LT.  Pt will report no pain 12 wk  2.  Pt will report no headaches 12 wk  3.  Increase c-spine AROM to WNL 12 wk  4.  Increase c-spine strength to WNL 12 wk  5.  Pt will report no limitations with ADL's 12 wk      ST.  Decrease pain 50% 6 wk  2.  Increase trunk ROM to minimal 6 wk  3.  Increase trunk strength to good -  6 wk  4.  Increase BLE strength to 4+/5 6 wk  LT.  Pt will report no pain 12 wk  2.  Increase trunk ROM to WNL 12 wk  3.  Increase trunk strength to  12 wk  4.  Pt will report no limitations with ADL's 12 wk  5.  Pt will report no limitations with ambulation 12 wk      Plan  Patient would benefit from: PT eval and skilled physical therapy  Planned modality interventions: cryotherapy and thermotherapy: hydrocollator packs    Planned therapy interventions: abdominal trunk stabilization, ADL training, body mechanics training, flexibility,  "functional ROM exercises, graded exercise, home exercise program, manual therapy, neuromuscular re-education, patient/caregiver education, postural training, self care, strengthening, stretching, therapeutic activities and therapeutic exercise    Frequency: 2-3x week  Duration in weeks: 12  Treatment plan discussed with: patient      Subjective Evaluation    History of Present Illness  Date of surgery: 8/15/2016  Mechanism of injury: surgery  Mechanism of injury: Patient is a 59 year old female presenting to this facility with current complaints of neck and low back pain. She had a C5-6 ACDF (2016).  She reports that she started losing muscle on the right side of her body many years ago which seems to have progressively worsened in the last 3-4 years. She also reports numbness on the entire right side of the body. Her entire right side feels that is has gotten progressively weaker compared to the left. She reports symptoms on the left side but much worse on R side.She denies any dysphagia. She had a couple falls in the last year on uneven surfaces but otherwise denies any significant gait instability.She denies recent changes to bowel or bladder habits. During her last neuro visit, the summary mentioned \"overall suspect that her current, largely subjective, symptoms are related to her known cervical myelopathy preceding decompression. There is no evidence on exam to suggest progressive myelopathy.\"  She is also experiencing pain involving her lower back with radiation into her right greater than left leg laterally. Overall, her neck and shoulder pain is the most bothersome. In 2016 she underwent the previously mentioned ACDF with some relief of her pain. However, over time her pain continued to progress. She experiences worsening pain with lifting her arms above her head. She feels her entire right side of her body is weak. Reaching behind her back is hard and painful. She did have injections in her low back in the " "past with little relief but the pain came back. She has not had any recent imaging. She has been to this facility in the past for her neck and back, it helped a little bit.  She does have an MRI scheduled toward the end of January. She was given medications for the pain and she took some yesterday with little relief in her pain. She often wears heeled sneakers as these help her pain. She notes pain in her whole body. She cleans her house, cooks everything. She is limited at times with these activities due to her pain. Overall she would like to get ann marie and decrease her pain best she can.   Quality of life: fair    Patient Goals  Patient goals for therapy: decreased pain, increased motion, increased strength, independence with ADLs/IADLs and return to sport/leisure activities  Patient goal: \"I need a lot of stretching and movement for the pain\"  Pain  Current pain ratin  At best pain ratin  At worst pain ratin  Location: both side of neck, shoulder, and low back  Quality: dull ache and sharp  Relieving factors: heat and medications  Exacerbated by: a lot of activitiy and bending down, showering.  Progression: worsening    Social Support  Steps to enter house: no  Stairs in house: no     Exercise history: none      Diagnostic Tests    FCE comments: Abnormal MRI and x-ray in past. Future MRI in January      Objective     Concurrent Complaints  Positive for headaches (sometimes). Negative for tinnitus and visual change    Palpation   Left   No palpable tenderness to the quadratus lumborum and suboccipitals.   Tenderness of the erector spinae, levator scapulae, middle trapezius, rhomboids and upper trapezius.   Trigger point to rhomboids.     Right   No palpable tenderness to the quadratus lumborum and suboccipitals.   Tenderness of the erector spinae, levator scapulae, middle trapezius, rhomboids and upper trapezius.   Trigger point to rhomboids.     Tenderness     Lumbar Spine  No tenderness in the " spinous process.     Left Hip   No tenderness in the PSIS.     Right Hip   No tenderness in the PSIS.     Neurological Testing     Sensation     Lumbar   Left   Intact: light touch    Right   Paresthesia: light touch    Reflexes   Left   Clonus sign: negative    Right   Clonus sign: negative    Additional Neurological Details  Pt reports slight difference in light touch exam, she can feel more in her L UE and LE    Active Range of Motion   Cervical/Thoracic Spine       Cervical    Flexion:  WFL  Extension: 40 degrees     with pain  Left lateral flexion: 25 degrees      Right lateral flexion: 25 degrees      Left rotation: 42 degrees  Right rotation: 30 degrees       Thoracic    Flexion:  Restriction level: moderate  Extension:  Restriction level: moderate  Left lateral flexion:  with pain Restriction level: minimal  Right lateral flexion:  with pain Restriction level: minimal  Left rotation:  Restriction level: moderate  Right rotation:  with pain Restriction level: moderate  Left Shoulder   Flexion: WFL  Abduction: WFL  External rotation BTH: C7   Internal rotation BTB: T12     Right Shoulder   Flexion: 170 degrees   Abduction: 170 degrees   External rotation BTH: C2   Internal rotation BTB: T12     Strength/Myotome Testing   Cervical Spine   Neck extension: 4+  Neck flexion: 4+    Left   Neck lateral flexion (C3): 4+    Right   Neck lateral flexion (C3): 4+    Left Shoulder     Planes of Motion   Flexion: 5   Extension: 5   Abduction: 5   Adduction: 5   External rotation at 0°: 4+   Internal rotation at 0°: 5     Right Shoulder     Planes of Motion   Flexion: 5   Extension: 5   Abduction: 5   Adduction: 5   External rotation at 0°: 4+   Internal rotation at 0°: 5     Left Elbow   Flexion: 5  Extension: 5    Right Elbow   Flexion: 5  Extension: 5    Left Hip   Planes of Motion   Flexion: 4+  Extension: 5  Abduction: 4+  Adduction: 5    Right Hip   Planes of Motion   Flexion: 4+  Extension: 5  Abduction:  4+  Adduction: 5    Left Knee   Flexion: 5  Extension: 5    Right Knee   Flexion: 5  Extension: 5    Left Ankle/Foot   Dorsiflexion: 5  Plantar flexion: 5    Right Ankle/Foot   Dorsiflexion: 5  Plantar flexion: 5    Additional Strength Details  Good  strength bilaterally     Muscle Activation   Patient able to activate left transverse abdominals and right transverse abdominals.     Additional Muscle Activation Details  With increased cues     Tests     Lumbar     Left   Negative passive SLR.     Right   Negative passive SLR.     Left Pelvic Girdle/Sacrum   Negative: active SLR test.     Right Pelvic Girdle/Sacrum   Negative: active SLR test.     Left Hip   Negative long sit.     Right Hip   Negative long sit.     Additional Tests Details  Tightness in posterior chain, L>R with passive SLR     Pt able to complete PPT and LTR with cues. Tightness reported in low back with LTR, no pain  Neuro Exam:     Headaches   Patient reports headaches: Yes (sometimes).     Sensation   Light touch LE: left WNL and right WNL              Precautions: Hx cervical ACDF 8-, hx cervical myelopathy   HEP: [id number]        Manuals PT Eval 1-6-25                                       Neuro Re-Ed         Scap squeeze        Foam roll up wall        Thoracic ext over roll        LTR        PPT        Abd pball iso        Ball roll out         Ther Ex        Nustep        MTP/LTP        Banded pull apart        Hamstring stretch         HR/TR                                        Pt Edu Patient issued HEP to begin targeting deficits found on eval       Ther Activity                        Gait Training                        Modalities        MHP/CP

## 2025-01-13 ENCOUNTER — OFFICE VISIT (OUTPATIENT)
Dept: PHYSICAL THERAPY | Facility: CLINIC | Age: 60
End: 2025-01-13
Payer: COMMERCIAL

## 2025-01-13 DIAGNOSIS — Z98.1 S/P CERVICAL SPINAL FUSION: Primary | ICD-10-CM

## 2025-01-13 DIAGNOSIS — M54.16 LUMBAR RADICULOPATHY: ICD-10-CM

## 2025-01-13 PROCEDURE — 97112 NEUROMUSCULAR REEDUCATION: CPT

## 2025-01-13 PROCEDURE — 97110 THERAPEUTIC EXERCISES: CPT

## 2025-01-13 NOTE — PROGRESS NOTES
"Daily Note     Today's date: 2025  Patient name: Merlyn Velasquez  : 1965  MRN: [id number]  Referring provider: Ankit Ramos MD  Dx:   Encounter Diagnosis     ICD-10-CM    1. S/P cervical spinal fusion  Z98.1       2. Lumbar radiculopathy  M54.16                      Subjective: Pt reports that she is feeling a little better, she has been working on the HEP.       Objective: See treatment diary below      Assessment: Tolerated treatment well. Implemented full program today and tolerated well. Minimal cues provided to ensure proper mechanics and core activation with all exercises. Pt reported to feel good post session, just a little dizzy due to position changes. Pt ambulated out of clinic with  without difficulty. Patient would benefit from continued PT      Plan: Progress treatment as tolerated.       Precautions: Hx cervical ACDF 8-, hx cervical myelopathy   HEP: [id number]        Manuals PT Eval 25                                      Neuro Re-Ed         Scap squeeze  5\"x20      Foam roll up wall  Add NV      Thoracic ext over roll  2 locations 5\"x10 at each       LTR  10\"x10       PPT  5\"x20      Abd pball iso  3-5\"x20      Ball roll out   Fwd, R and L 5x5\" each       Ther Ex        Nustep  Lv5 10'      MTP/LTP  Dbl blue 2x10 each       Banded pull apart        Hamstring stretch   10\"x12 giuseppe with strap       HR/TR                                        Pt Edu Patient issued HEP to begin targeting deficits found on eval       Ther Activity                        Gait Training                        Modalities        MHP/CP                      "

## 2025-01-20 ENCOUNTER — OFFICE VISIT (OUTPATIENT)
Dept: PHYSICAL THERAPY | Facility: CLINIC | Age: 60
End: 2025-01-20
Payer: COMMERCIAL

## 2025-01-20 DIAGNOSIS — Z98.1 S/P CERVICAL SPINAL FUSION: Primary | ICD-10-CM

## 2025-01-20 DIAGNOSIS — M54.16 LUMBAR RADICULOPATHY: ICD-10-CM

## 2025-01-20 PROCEDURE — 97110 THERAPEUTIC EXERCISES: CPT

## 2025-01-20 PROCEDURE — 97112 NEUROMUSCULAR REEDUCATION: CPT

## 2025-01-20 NOTE — PROGRESS NOTES
"Daily Note     Today's date: 2025  Patient name: Merlyn Velasquez  : 1965  MRN: [id number]  Referring provider: Ankit Ramos MD  Dx:   Encounter Diagnosis     ICD-10-CM    1. S/P cervical spinal fusion  Z98.1       2. Lumbar radiculopathy  M54.16                      Subjective: Pt reports that she is doing well, her weekend was good. She has discomfort in the legs with the stretches.       Objective: See treatment diary below      Assessment: Tolerated treatment well. Pt tolerated all exercises well. Added progression to program with VC to ensure proper mechanics. Pt reported slight discomfort in her right side low back at end of session but no complaints of any pain during exercises. She deferred MHP. She was provided with updated HEP.  Patient would benefit from continued PT.       Plan: Continue per plan of care.      Precautions: Hx cervical ACDF 8-, hx cervical myelopathy   HEP: [id number]        Manuals PT Eval 25                                     Neuro Re-Ed         Scap squeeze  5\"x20 5\"x20 around roll      Foam roll up wall  Add NV X10 3\"     Thoracic ext over roll  2 locations 5\"x10 at each  2 locations 5\"x10 at each      LTR  10\"x10  10\"x10      PPT  5\"x20 5\"x20     Abd pball iso  3-5\"x20 5\"x20     Ball roll out   Fwd, R and L 5x5\" each  Fwd, R and L 5x5\" each from low mat table     Ther Ex        Nustep  Lv5 10' Lv5 10'     MTP/LTP  Dbl blue 2x10 each  Dbl blue 3x10 each      Banded pull apart        Hamstring stretch   10\"x12 giuseppe with strap  10\"x12 giusepep with strap      HR/TR   X20 each way      Clamshells    Green 2x10 SL     Hip add   5\"x20                     Pt Edu Patient issued HEP to begin targeting deficits found on eval  Updated HEP and explanations given for each progression     Ther Activity                        Gait Training                        Modalities        MHP/CP   Deferred                      "

## 2025-01-27 ENCOUNTER — OFFICE VISIT (OUTPATIENT)
Dept: PHYSICAL THERAPY | Facility: CLINIC | Age: 60
End: 2025-01-27
Payer: COMMERCIAL

## 2025-01-27 DIAGNOSIS — Z98.1 S/P CERVICAL SPINAL FUSION: Primary | ICD-10-CM

## 2025-01-27 DIAGNOSIS — M54.16 LUMBAR RADICULOPATHY: ICD-10-CM

## 2025-01-27 PROCEDURE — 97110 THERAPEUTIC EXERCISES: CPT

## 2025-01-27 NOTE — PROGRESS NOTES
"Daily Note     Today's date: 2025  Patient name: Merlyn Velasquez  : 1965  MRN: [id number]  Referring provider: Ankit Ramos MD  Dx:   Encounter Diagnosis     ICD-10-CM    1. S/P cervical spinal fusion  Z98.1       2. Lumbar radiculopathy  M54.16                      Subjective: Pt reports that her muscle in the top of her butt was really sore after last time for like 3 days. She doesn't think it should be as sore. Other than that she is feeling good.       Objective: See treatment diary below      Assessment: Tolerated treatment well. Pt tolerated all exercises well with no complaints of any pain. VC provided throughout session to ensure proper exercise technique. Pt reported to feel better post session. Patient would benefit from continued PT      Plan: Progress treatment as tolerated.       Precautions: Hx cervical ACDF 8-, hx cervical myelopathy   HEP: [id number]        Manuals PT Eval 25                                    Neuro Re-Ed         Scap squeeze  5\"x20 5\"x20 around roll  5\"x20 around roll     Foam roll up wall  Add NV X10 3\" X15 5\"    Thoracic ext over roll  2 locations 5\"x10 at each  2 locations 5\"x10 at each  2 locations 5\"x10 at each     LTR  10\"x10  10\"x10  10\"x10     PPT  5\"x20 5\"x20 5\"x20    Abd pball iso  3-5\"x20 5\"x20 5\"x20    Ball roll out   Fwd, R and L 5x5\" each  Fwd, R and L 5x5\" each from low mat table Fwd, R and L 5x5\" each from low mat table    Ther Ex        Nustep  Lv5 10' Lv5 10' Lv5 10'    MTP/LTP  Dbl blue 2x10 each  Dbl blue 3x10 each  Dbl blue 3x10 each     Banded pull apart        Hamstring stretch   10\"x12 giuseppe with strap  10\"x12 giuseppe with strap  10\"x12 giuseppe with strap    HR/TR   X20 each way  X30 each way     Clamshells    Green 2x10 SL Hold     Hip add   5\"x20 Seated 5\"x20                     Pt Edu Patient issued HEP to begin targeting deficits found on eval  Updated HEP and explanations given for each progression     Ther Activity   "                      Gait Training                        Modalities        MHP/CP   Deferred

## 2025-02-05 ENCOUNTER — OFFICE VISIT (OUTPATIENT)
Dept: PHYSICAL THERAPY | Facility: CLINIC | Age: 60
End: 2025-02-05
Payer: COMMERCIAL

## 2025-02-05 DIAGNOSIS — Z98.1 S/P CERVICAL SPINAL FUSION: Primary | ICD-10-CM

## 2025-02-05 DIAGNOSIS — M54.16 LUMBAR RADICULOPATHY: ICD-10-CM

## 2025-02-05 PROCEDURE — 97112 NEUROMUSCULAR REEDUCATION: CPT

## 2025-02-05 PROCEDURE — 97110 THERAPEUTIC EXERCISES: CPT

## 2025-02-05 NOTE — PROGRESS NOTES
"Daily Note     Today's date: 2025  Patient name: Merlyn Velasquez  : 1965  MRN: [id number]  Referring provider: Ankit Ramos MD  Dx:   Encounter Diagnosis     ICD-10-CM    1. S/P cervical spinal fusion  Z98.1       2. Lumbar radiculopathy  M54.16                      Subjective: Merlyn reports pain in c-spine across both shoulders. She is scheduled to get injections in c-spine. She reports LB being okay. She reports not doing much exercise this week as her BP was higher but is now on new medication and is under control.       Objective: See treatment diary below      Assessment: Visual and VC to ensure correct exercise technique. Denied any increases in pain with exercises performed. Progress as able.       Plan: Continue with current POC to address pt deficits.      Precautions: Hx cervical ACDF 8-, hx cervical myelopathy   HEP: [id number]        Manuals PT Eval 25                                   Neuro Re-Ed         Scap squeeze  5\"x20 5\"x20 around roll  5\"x20 around roll  5\" x20 around roll    Foam roll up wall  Add NV X10 3\" X15 5\" X15 5\"    Thoracic ext over roll  2 locations 5\"x10 at each  2 locations 5\"x10 at each  2 locations 5\"x10 at each  2 locations 5\" x10 at ea    LTR  10\"x10  10\"x10  10\"x10  10\"x10 giuseppe    PPT  5\"x20 5\"x20 5\"x20 5\" x20   Abd pball iso  3-5\"x20 5\"x20 5\"x20 5\" x20   Ball roll out   Fwd, R and L 5x5\" each  Fwd, R and L 5x5\" each from low mat table Fwd, R and L 5x5\" each from low mat table Fwd, R and L 5x5\" each from low mat table   Ther Ex        Nustep  Lv5 10' Lv5 10' Lv5 10' Lv5 10'           MTP/LTP  Dbl blue 2x10 each  Dbl blue 3x10 each  Dbl blue 3x10 each  Dbl blue 3x10 ea   Banded pull apart        Hamstring stretch   10\"x12 giuseppe with strap  10\"x12 giuseppe with strap  10\"x12 giuseppe with strap 10\"x12 giuseppe with strap    HR/TR   X20 each way  X30 each way  X20 each way    [place]    Green 2x10 SL Hold     Hip add   5\"x20 Seated 5\"x20  " "Seated 5\" x20                   Pt Edu Patient issued HEP to begin targeting deficits found on eval  Updated HEP and explanations given for each progression     Ther Activity                        Gait Training                        Modalities        MHP/CP   Deferred                          "

## 2025-02-12 ENCOUNTER — OFFICE VISIT (OUTPATIENT)
Dept: PHYSICAL THERAPY | Facility: CLINIC | Age: 60
End: 2025-02-12
Payer: COMMERCIAL

## 2025-02-12 DIAGNOSIS — M54.16 LUMBAR RADICULOPATHY: ICD-10-CM

## 2025-02-12 DIAGNOSIS — Z98.1 S/P CERVICAL SPINAL FUSION: Primary | ICD-10-CM

## 2025-02-12 PROCEDURE — 97110 THERAPEUTIC EXERCISES: CPT

## 2025-02-12 PROCEDURE — 97112 NEUROMUSCULAR REEDUCATION: CPT

## 2025-02-12 NOTE — PROGRESS NOTES
"Daily Note     Today's date: 2025  Patient name: Merlyn Velasquez  : 1965  MRN: [id number]  Referring provider: Ankit Ramos MD  Dx:   Encounter Diagnosis     ICD-10-CM    1. S/P cervical spinal fusion  Z98.1       2. Lumbar radiculopathy  M54.16                      Subjective: Merlyn reports doing \"okay\" in reference to c-spine. She reports pain is worse in LB in the morning and has she moves throughout the day it improves.       Objective: See treatment diary below      Assessment: Visual and VC to ensure correct exercise technique. Denied any increases in pain with exercises performed. Updated HEP to include thoracic extension and lumbar stretch to continue at home as pt is only able to make it 1x next week. Pt would continue to benefit from skilled PT. Progress as able.       Plan: Continue with current POC to address pt deficits.      Precautions: Hx cervical ACDF 8-, hx cervical myelopathy   HEP: [id number]        Manuals 25                                   Neuro Re-Ed         Scap squeeze 5\" x20 standing  5\"x20 5\"x20 around roll  5\"x20 around roll  5\" x20 around roll    Foam roll up wall X15 5\"  Add NV X10 3\" X15 5\" X15 5\"    Thoracic ext over roll 2 locations 5\" x10 at ea  2 locations 5\"x10 at each  2 locations 5\"x10 at each  2 locations 5\"x10 at each  2 locations 5\" x10 at ea    LTR 10\"x10 giuseppe  10\"x10  10\"x10  10\"x10  10\"x10 giuseppe    PPT 5\" x20 5\"x20 5\"x20 5\"x20 5\" x20   Abd pball iso 5\" x20 3-5\"x20 5\"x20 5\"x20 5\" x20   Ball roll out  Fwd, R and L 5x5\" each from low mat table Fwd, R and L 5x5\" each  Fwd, R and L 5x5\" each from low mat table Fwd, R and L 5x5\" each from low mat table Fwd, R and L 5x5\" each from low mat table   Ther Ex        Nustep Lv5 10' Lv5 10' Lv5 10' Lv5 10' Lv5 10'           MTP/LTP Dbl blue 3x10 ea Dbl blue 2x10 each  Dbl blue 3x10 each  Dbl blue 3x10 each  Dbl blue 3x10 ea   Banded pull apart        Hamstring stretch  10\"x12 giuseppe " "with strap  10\"x12 giuseppe with strap  10\"x12 giuseppe with strap  10\"x12 giuseppe with strap 10\"x12 giuseppe with strap    HR/TR X20 ea  X20 each way  X30 each way  X20 each way    Clamshells    Green 2x10 SL Hold     Hip add Seated 5\" x20  5\"x20 Seated 5\"x20  Seated 5\" x20                   Pt Edu   Updated HEP and explanations given for each progression     Ther Activity                        Gait Training                        Modalities        MHP/CP   Deferred                            "

## 2025-02-13 ENCOUNTER — APPOINTMENT (OUTPATIENT)
Dept: PHYSICAL THERAPY | Facility: CLINIC | Age: 60
End: 2025-02-13
Payer: COMMERCIAL

## 2025-02-17 ENCOUNTER — OFFICE VISIT (OUTPATIENT)
Dept: PHYSICAL THERAPY | Facility: CLINIC | Age: 60
End: 2025-02-17
Payer: COMMERCIAL

## 2025-02-17 DIAGNOSIS — Z98.1 S/P CERVICAL SPINAL FUSION: Primary | ICD-10-CM

## 2025-02-17 DIAGNOSIS — M54.16 LUMBAR RADICULOPATHY: ICD-10-CM

## 2025-02-17 PROCEDURE — 97112 NEUROMUSCULAR REEDUCATION: CPT

## 2025-02-17 PROCEDURE — 97110 THERAPEUTIC EXERCISES: CPT

## 2025-02-17 NOTE — PROGRESS NOTES
"Daily Note     Today's date: 2025  Patient name: Merlyn Velasquez  : 1965  MRN: [id number]  Referring provider: Ankit Ramos MD  Dx:   Encounter Diagnosis     ICD-10-CM    1. S/P cervical spinal fusion  Z98.1       2. Lumbar radiculopathy  M54.16           Start Time: 1550          Subjective: Merlyn reports overall doing \"better\" in reference to LB and c-spine. She was cooking a lot over the weekend. She reports less pain with movement and more pain with static positions.       Objective: See treatment diary below      Assessment: Visual and VC to ensure correct exercise technique. Added paloff press this visit to continue progressing core stability; tolerated well denying any increases in pain. Tactile cues to avoid UT compensation during tband exercises. Pt would continue to benefit from skilled PT.       Plan: Continue with current POC to address pt deficits.      Precautions: Hx cervical ACDF 8-, hx cervical myelopathy   HEP: [id number]        Manuals 25                                   Neuro Re-Ed         Scap squeeze 5\" x20 standing  5\" x20 standing  5\"x20 around roll  5\"x20 around roll  5\" x20 around roll    Foam roll up wall X15 5\"  X15 5\"  X10 3\" X15 5\" X15 5\"    Thoracic ext over roll 2 locations 5\" x10 at ea  2 locations 5\" x10 at ea  2 locations 5\"x10 at each  2 locations 5\"x10 at each  2 locations 5\" x10 at ea    LTR 10\"x10 giuseppe  10\"x10 giuseppe  10\"x10  10\"x10  10\"x10 giuseppe    PPT 5\" x20 5\" x20 5\"x20 5\"x20 5\" x20   Abd pball iso 5\" x20 5\" x20 5\"x20 5\"x20 5\" x20   Ball roll out  Fwd, R and L 5x5\" each from low mat table Fwd, R and L 5x5\" each from low mat table Fwd, R and L 5x5\" each from low mat table Fwd, R and L 5x5\" each from low mat table Fwd, R and L 5x5\" each from low mat table   Paloff Press   Dbl red 2x10 giuseppe       Ther Ex        Nustep Lv5 10' Lv5 12' Lv5 10' Lv5 10' Lv5 10'           MTP/LTP Dbl blue 3x10 ea Dbl blue 3x10 Dbl blue 3x10 each  Dbl " "blue 3x10 each  Dbl blue 3x10 ea   Banded pull apart        Hamstring stretch  10\"x12 giuseppe with strap  10\"x12 giuseppe with strap  10\"x12 giuseppe with strap  10\"x12 giuseppe with strap 10\"x12 giuseppe with strap    HR/TR X20 ea X20 ea X20 each way  X30 each way  X20 each way    Clamshells    Green 2x10 SL Hold     Hip add Seated 5\" x20 Seated 5\" x20 5\"x20 Seated 5\"x20  Seated 5\" x20                   Pt Edu   Updated HEP and explanations given for each progression     Ther Activity                        Gait Training                        Modalities        MHP/CP   Deferred                              "

## 2025-02-24 ENCOUNTER — EVALUATION (OUTPATIENT)
Dept: PHYSICAL THERAPY | Facility: CLINIC | Age: 60
End: 2025-02-24
Payer: COMMERCIAL

## 2025-02-24 DIAGNOSIS — M54.16 LUMBAR RADICULOPATHY: ICD-10-CM

## 2025-02-24 DIAGNOSIS — Z98.1 S/P CERVICAL SPINAL FUSION: Primary | ICD-10-CM

## 2025-02-24 PROCEDURE — 97112 NEUROMUSCULAR REEDUCATION: CPT | Performed by: PHYSICAL THERAPIST

## 2025-02-24 PROCEDURE — 97110 THERAPEUTIC EXERCISES: CPT | Performed by: PHYSICAL THERAPIST

## 2025-02-24 PROCEDURE — 97164 PT RE-EVAL EST PLAN CARE: CPT | Performed by: PHYSICAL THERAPIST

## 2025-02-24 NOTE — LETTER
2025    Ankit Ramos MD  [facility] [address]    Patient: Merlyn Velasquez   YOB: 1965   Date of Visit: 2025     Encounter Diagnosis     ICD-10-CM    1. S/P cervical spinal fusion  Z98.1       2. Lumbar radiculopathy  M54.16           Dear Dr. Ramos:    Thank you for your recent referral of Merlyn Velasquez. Please review the attached evaluation summary from Merlyn's recent visit.     Please verify that you agree with the plan of care by signing the attached order.     If you have any questions or concerns, please do not hesitate to call.     I sincerely appreciate the opportunity to share in the care of one of your patients and hope to have another opportunity to work with you in the near future.       Sincerely,    Storm Tanner, PT      Referring Provider:      I certify that I have read the below Plan of Care and certify the need for these services furnished under this plan of treatment while under my care.                    Ankit Ramos MD  [address] [facility]  [place] [address]  Via Fax: [phone]          PT Evaluation     Today's date: 2025  Patient name: Merlyn Velasquez  : 1965  MRN: [id number]  Referring provider: Ankit Ramos MD  Dx:   Encounter Diagnosis     ICD-10-CM    1. S/P cervical spinal fusion  Z98.1       2. Lumbar radiculopathy  M54.16                      Assessment  Impairments: abnormal or restricted ROM, impaired physical strength, lacks appropriate home exercise program, pain with function, poor posture , poor body mechanics, activity limitations and endurance  Symptom irritability: moderate    Assessment details: Patient is a 59 year old female presenting to this facility with complaints of pain in her whole body, but her neck and back in particular. She has a hx of ACDF on 8- for cervical myelopathy and patient reports improvements initially in symptoms however now they continue to worsen. She also has  hx of injections in her back with initial improvements but then returning of sx. Symptoms of aches, sharp pain, numbness, and tingling vary in intensity and frequency in her neck, low back, UE and LE, R>L. Upon evaluation, patient demonstrated good c/s,  UE and LE strength, increased tightness in LE posterior chain musculature, TTP to c/s and l/s psp, pain with MMT. Increased difficulty with supine to sit transfer with poor mechanics. Fwd head and rounded shoulders present. Patient was provided with HEP to begin targeting deficits noted above. Pt reports of right sided weakness appear subjective. Patient would benefit from skilled physical therapy to address deficits in ROM, posture, improve strength in order to decrease pain, maximize functional mobility, and to maximize independence with ADLs.     25:  Pt showing slight improvement in pain along with improved core contraction of muscles.  Progressing with functional strengthening and ADL's becoming easier at home.  Understanding of Dx/Px/POC: good     Prognosis: good    Goals  ST.  Decreased pain 50% 6 wk   -SOME PROGRESS  2.  Increase c-spine ROM to minimal limitations 6wk  - SOME PROGRESS  3.  Increase bilateral UE strength to 5/5 6wk  -SOME PROGRESS  6.  Pt will report no difficulty with light ADL's 6 wk -GOOD PROGRESS    LT.  Pt will report no pain 12 wk  -LITTLE PROGRESS   2.  Pt will report no headaches 12 wk  3.  Increase c-spine AROM to WNL 12 wk  -SLIGHT PROGRESS4.  Increase c-spine strength to WNL 12 wk  -LITTLE PROGRESS  5.  Pt will report no limitations with ADL's 12 wk      ST.  Decrease pain 50% 6 wk  2.  Increase trunk ROM to minimal 6 wk  3.  Increase trunk strength to good -  6 wk  4.  Increase BLE strength to 4+/5 6 wk  LT.  Pt will report no pain 12 wk  2.  Increase trunk ROM to WNL 12 wk  3.  Increase trunk strength to  12 wk  4.  Pt will report no limitations with ADL's 12 wk  5.  Pt will report no limitations with  "ambulation 12 wk      Plan  Patient would benefit from: PT eval and skilled physical therapy  Planned modality interventions: cryotherapy and thermotherapy: hydrocollator packs    Planned therapy interventions: abdominal trunk stabilization, ADL training, body mechanics training, flexibility, functional ROM exercises, graded exercise, home exercise program, manual therapy, neuromuscular re-education, patient/caregiver education, postural training, self care, strengthening, stretching, therapeutic activities and therapeutic exercise    Frequency: 2-3x week  Duration in weeks: 8  Treatment plan discussed with: patient      Subjective Evaluation    History of Present Illness  Date of surgery: 8/15/2016  Mechanism of injury: surgery  Mechanism of injury: Patient is a 59 year old female presenting to this facility with current complaints of neck and low back pain. She had a C5-6 ACDF (2016).  She reports that she started losing muscle on the right side of her body many years ago which seems to have progressively worsened in the last 3-4 years. She also reports numbness on the entire right side of the body. Her entire right side feels that is has gotten progressively weaker compared to the left. She reports symptoms on the left side but much worse on R side.She denies any dysphagia. She had a couple falls in the last year on uneven surfaces but otherwise denies any significant gait instability.She denies recent changes to bowel or bladder habits. During her last neuro visit, the summary mentioned \"overall suspect that her current, largely subjective, symptoms are related to her known cervical myelopathy preceding decompression. There is no evidence on exam to suggest progressive myelopathy.\"  She is also experiencing pain involving her lower back with radiation into her right greater than left leg laterally. Overall, her neck and shoulder pain is the most bothersome. In 2016 she underwent the previously mentioned ACDF " "with some relief of her pain. However, over time her pain continued to progress. She experiences worsening pain with lifting her arms above her head. She feels her entire right side of her body is weak. Reaching behind her back is hard and painful. She did have injections in her low back in the past with little relief but the pain came back. She has not had any recent imaging. She has been to this facility in the past for her neck and back, it helped a little bit.  She does have an MRI scheduled toward the end of January. She was given medications for the pain and she took some yesterday with little relief in her pain. She often wears heeled sneakers as these help her pain. She notes pain in her whole body. She cleans her house, cooks everything. She is limited at times with these activities due to her pain. Overall she would like to get ann marie and decrease her pain best she can.     25:  Pt reports frequency of intense pain is getting better and she is getting stronger and more mobile with therapy.    Quality of life: fair    Patient Goals  Patient goals for therapy: decreased pain, increased motion, increased strength, independence with ADLs/IADLs and return to sport/leisure activities  Patient goal: \"I need a lot of stretching and movement for the pain\"  Pain  Current pain ratin  At best pain rating: 3  At worst pain ratin  Location: both side of neck, shoulder, and low back  Quality: dull ache and sharp  Relieving factors: heat and medications  Exacerbated by: a lot of activitiy and bending down, showering.  Progression: worsening    Social Support  Steps to enter house: no  Stairs in house: no     Exercise history: none      Diagnostic Tests    [facility] comments: Abnormal MRI and x-ray in past. Future MRI in January      Objective     Concurrent Complaints  Positive for headaches (sometimes). Negative for tinnitus and visual change    Palpation   Left   No palpable tenderness to the quadratus lumborum " and suboccipitals.   Tenderness of the erector spinae, levator scapulae, middle trapezius, rhomboids and upper trapezius.   Trigger point to rhomboids.     Right   No palpable tenderness to the quadratus lumborum and suboccipitals.   Tenderness of the erector spinae, levator scapulae, middle trapezius, rhomboids and upper trapezius.   Trigger point to rhomboids.     Tenderness     Lumbar Spine  No tenderness in the spinous process.     Left Hip   No tenderness in the PSIS.     Right Hip   No tenderness in the PSIS.     Neurological Testing     Sensation     Lumbar   Left   Intact: light touch    Right   Paresthesia: light touch    Reflexes   Left   Clonus sign: negative    Right   Clonus sign: negative    Additional Neurological Details  Pt reports slight difference in light touch exam, she can feel more in her L UE and LE    Active Range of Motion   Cervical/Thoracic Spine       Cervical    Flexion:  WFL  Extension: 40 degrees     with pain  Left lateral flexion: 25 degrees      Right lateral flexion: 25 degrees      Left rotation: 42 degrees  Right rotation: 30 degrees       Thoracic    Flexion:  Restriction level: moderate  Extension:  Restriction level: moderate  Left lateral flexion:  with pain Restriction level: minimal  Right lateral flexion:  with pain Restriction level: minimal  Left rotation:  Restriction level: moderate  Right rotation:  with pain Restriction level: moderate  Left Shoulder   Flexion: WFL  Abduction: WFL  External rotation BTH: C7   Internal rotation BTB: T12     Right Shoulder   Flexion: 170 degrees   Abduction: 170 degrees   External rotation BTH: C2   Internal rotation BTB: T12     Strength/Myotome Testing   Cervical Spine   Neck extension: 4+  Neck flexion: 4+    Left   Neck lateral flexion (C3): 4+    Right   Neck lateral flexion (C3): 4+    Left Shoulder     Planes of Motion   Flexion: 5   Extension: 5   Abduction: 5   Adduction: 5   External rotation at 0°: 4+   Internal rotation at  "0°: 5     Right Shoulder     Planes of Motion   Flexion: 5   Extension: 5   Abduction: 5   Adduction: 5   External rotation at 0°: 4+   Internal rotation at 0°: 5     Left Elbow   Flexion: 5  Extension: 5    Right Elbow   Flexion: 5  Extension: 5    Left Hip   Planes of Motion   Flexion: 4+  Extension: 5  Abduction: 4+  Adduction: 5    Right Hip   Planes of Motion   Flexion: 4+  Extension: 5  Abduction: 4+  Adduction: 5    Left Knee   Flexion: 5  Extension: 5    Right Knee   Flexion: 5  Extension: 5    Left Ankle/Foot   Dorsiflexion: 5  Plantar flexion: 5    Right Ankle/Foot   Dorsiflexion: 5  Plantar flexion: 5    Additional Strength Details  Good  strength bilaterally     Muscle Activation     Additional Muscle Activation Details  With increased cues     Tests     Lumbar     Left   Negative passive SLR.     Right   Negative passive SLR.     Left Pelvic Girdle/Sacrum   Negative: active SLR test.     Right Pelvic Girdle/Sacrum   Negative: active SLR test.     Left Hip   Negative long sit.     Right Hip   Negative long sit.     Additional Tests Details  Tightness in posterior chain, L>R with passive SLR     Pt able to complete PPT and LTR with cues. Tightness reported in low back with LTR, no pain  Neuro Exam:     Headaches   Patient reports headaches: Yes (sometimes).     Sensation   Light touch LE: left WNL and right WNL              Precautions: Hx cervical ACDF 8-, hx cervical myelopathy   HEP: [id number]        Manuals 2-12-25 2-17-25 2-24-25 1-27-25 2-5-25                                   Neuro Re-Ed         Scap squeeze 5\" x20 standing  5\" x20 standing  5\"x20 around roll  5\"x20 around roll  5\" x20 around roll    Foam roll up wall X15 5\"  X15 5\"  X10 3\" X15 5\" X15 5\"    Thoracic ext over roll 2 locations 5\" x10 at ea  2 locations 5\" x10 at ea  2 locations 5\"x10 at each  2 locations 5\"x10 at each  2 locations 5\" x10 at ea    LTR 10\"x10 giuseppe  10\"x10 giuseppe  10\"x10  10\"x10  10\"x10 giuseppe    PPT 5\" x20 5\" x20 " "5\"x20 5\"x20 5\" x20   Abd pball iso 5\" x20 5\" x20 5\"x20 5\"x20 5\" x20   Ball roll out  Fwd, R and L 5x5\" each from low mat table Fwd, R and L 5x5\" each from low mat table Fwd, R and L 5x5\" each from low mat table Fwd, R and L 5x5\" each from low mat table Fwd, R and L 5x5\" each from low mat table   Paloff Press   Dbl red 2x10 giuseppe       Ther Ex        Nustep Lv5 10' Lv5 12' Lv5 10' Lv5 10' Lv5 10'           MTP/LTP Dbl blue 3x10 ea Dbl blue 3x10 Dbl blue 3x10 each  Dbl blue 3x10 each  Dbl blue 3x10 ea   Banded pull apart        Hamstring stretch  10\"x12 giuseppe with strap  10\"x12 giuseppe with strap  10\"x12 giuseppe with strap  10\"x12 giuseppe with strap 10\"x12 giuseppe with strap    HR/TR X20 ea X20 ea X20 each way  X30 each way  X20 each way    Clamshells    Green 2x10 SL Hold     Hip add Seated 5\" x20 Seated 5\" x20 5\"x20 Seated 5\"x20  Seated 5\" x20                   Pt Edu   Updated HEP and explanations given for each progression     Ther Activity                        Gait Training                        Modalities        MHP/CP   Deferred                                        "

## 2025-02-24 NOTE — PROGRESS NOTES
PT Evaluation     Today's date: 2025  Patient name: Merlyn Velasquez  : 1965  MRN: [id number]  Referring provider: Ankit Ramos MD  Dx:   Encounter Diagnosis     ICD-10-CM    1. S/P cervical spinal fusion  Z98.1       2. Lumbar radiculopathy  M54.16                      Assessment  Impairments: abnormal or restricted ROM, impaired physical strength, lacks appropriate home exercise program, pain with function, poor posture , poor body mechanics, activity limitations and endurance  Symptom irritability: moderate    Assessment details: Patient is a 59 year old female presenting to this facility with complaints of pain in her whole body, but her neck and back in particular. She has a hx of ACDF on 8- for cervical myelopathy and patient reports improvements initially in symptoms however now they continue to worsen. She also has hx of injections in her back with initial improvements but then returning of sx. Symptoms of aches, sharp pain, numbness, and tingling vary in intensity and frequency in her neck, low back, UE and LE, R>L. Upon evaluation, patient demonstrated good c/s,  UE and LE strength, increased tightness in LE posterior chain musculature, TTP to c/s and l/s psp, pain with MMT. Increased difficulty with supine to sit transfer with poor mechanics. Fwd head and rounded shoulders present. Patient was provided with HEP to begin targeting deficits noted above. Pt reports of right sided weakness appear subjective. Patient would benefit from skilled physical therapy to address deficits in ROM, posture, improve strength in order to decrease pain, maximize functional mobility, and to maximize independence with ADLs.     25:  Pt showing slight improvement in pain along with improved core contraction of muscles.  Progressing with functional strengthening and ADL's becoming easier at home.  Understanding of Dx/Px/POC: good     Prognosis: good    Goals  ST.  Decreased pain 50% 6 wk   -SOME  PROGRESS  2.  Increase c-spine ROM to minimal limitations 6wk  - SOME PROGRESS  3.  Increase bilateral UE strength to 5/5 6wk  -SOME PROGRESS  6.  Pt will report no difficulty with light ADL's 6 wk -GOOD PROGRESS    LT.  Pt will report no pain 12 wk  -LITTLE PROGRESS   2.  Pt will report no headaches 12 wk  3.  Increase c-spine AROM to WNL 12 wk  -SLIGHT PROGRESS4.  Increase c-spine strength to WNL 12 wk  -LITTLE PROGRESS  5.  Pt will report no limitations with ADL's 12 wk      ST.  Decrease pain 50% 6 wk  2.  Increase trunk ROM to minimal 6 wk  3.  Increase trunk strength to good -  6 wk  4.  Increase BLE strength to 4+/5 6 wk  LT.  Pt will report no pain 12 wk  2.  Increase trunk ROM to WNL 12 wk  3.  Increase trunk strength to  12 wk  4.  Pt will report no limitations with ADL's 12 wk  5.  Pt will report no limitations with ambulation 12 wk      Plan  Patient would benefit from: PT eval and skilled physical therapy  Planned modality interventions: cryotherapy and thermotherapy: hydrocollator packs    Planned therapy interventions: abdominal trunk stabilization, ADL training, body mechanics training, flexibility, functional ROM exercises, graded exercise, home exercise program, manual therapy, neuromuscular re-education, patient/caregiver education, postural training, self care, strengthening, stretching, therapeutic activities and therapeutic exercise    Frequency: 2-3x week  Duration in weeks: 8  Treatment plan discussed with: patient      Subjective Evaluation    History of Present Illness  Date of surgery: 8/15/2016  Mechanism of injury: surgery  Mechanism of injury: Patient is a 59 year old female presenting to this facility with current complaints of neck and low back pain. She had a C5-6 ACDF ().  She reports that she started losing muscle on the right side of her body many years ago which seems to have progressively worsened in the last 3-4 years. She also reports numbness on the entire  "right side of the body. Her entire right side feels that is has gotten progressively weaker compared to the left. She reports symptoms on the left side but much worse on R side.She denies any dysphagia. She had a couple falls in the last year on uneven surfaces but otherwise denies any significant gait instability.She denies recent changes to bowel or bladder habits. During her last neuro visit, the summary mentioned \"overall suspect that her current, largely subjective, symptoms are related to her known cervical myelopathy preceding decompression. There is no evidence on exam to suggest progressive myelopathy.\"  She is also experiencing pain involving her lower back with radiation into her right greater than left leg laterally. Overall, her neck and shoulder pain is the most bothersome. In 2016 she underwent the previously mentioned ACDF with some relief of her pain. However, over time her pain continued to progress. She experiences worsening pain with lifting her arms above her head. She feels her entire right side of her body is weak. Reaching behind her back is hard and painful. She did have injections in her low back in the past with little relief but the pain came back. She has not had any recent imaging. She has been to this facility in the past for her neck and back, it helped a little bit.  She does have an MRI scheduled toward the end of January. She was given medications for the pain and she took some yesterday with little relief in her pain. She often wears heeled sneakers as these help her pain. She notes pain in her whole body. She cleans her house, cooks everything. She is limited at times with these activities due to her pain. Overall she would like to get ann marie and decrease her pain best she can.     2-24-25:  Pt reports frequency of intense pain is getting better and she is getting stronger and more mobile with therapy.    Quality of life: fair    Patient Goals  Patient goals for therapy: " "decreased pain, increased motion, increased strength, independence with ADLs/IADLs and return to sport/leisure activities  Patient goal: \"I need a lot of stretching and movement for the pain\"  Pain  Current pain ratin  At best pain rating: 3  At worst pain ratin  Location: both side of neck, shoulder, and low back  Quality: dull ache and sharp  Relieving factors: heat and medications  Exacerbated by: a lot of activitiy and bending down, showering.  Progression: worsening    Social Support  Steps to enter house: no  Stairs in house: no     Exercise history: none      Diagnostic Tests    [facility] comments: Abnormal MRI and x-ray in past. Future MRI in January      Objective     Concurrent Complaints  Positive for headaches (sometimes). Negative for tinnitus and visual change    Palpation   Left   No palpable tenderness to the quadratus lumborum and suboccipitals.   Tenderness of the erector spinae, levator scapulae, middle trapezius, rhomboids and upper trapezius.   Trigger point to rhomboids.     Right   No palpable tenderness to the quadratus lumborum and suboccipitals.   Tenderness of the erector spinae, levator scapulae, middle trapezius, rhomboids and upper trapezius.   Trigger point to rhomboids.     Tenderness     Lumbar Spine  No tenderness in the spinous process.     Left Hip   No tenderness in the PSIS.     Right Hip   No tenderness in the PSIS.     Neurological Testing     Sensation     Lumbar   Left   Intact: light touch    Right   Paresthesia: light touch    Reflexes   Left   Clonus sign: negative    Right   Clonus sign: negative    Additional Neurological Details  Pt reports slight difference in light touch exam, she can feel more in her L UE and LE    Active Range of Motion   Cervical/Thoracic Spine       Cervical    Flexion:  WFL  Extension: 40 degrees     with pain  Left lateral flexion: 25 degrees      Right lateral flexion: 25 degrees      Left rotation: 42 degrees  Right rotation: 30 degrees "       Thoracic    Flexion:  Restriction level: moderate  Extension:  Restriction level: moderate  Left lateral flexion:  with pain Restriction level: minimal  Right lateral flexion:  with pain Restriction level: minimal  Left rotation:  Restriction level: moderate  Right rotation:  with pain Restriction level: moderate  Left Shoulder   Flexion: WFL  Abduction: WFL  External rotation BTH: C7   Internal rotation BTB: T12     Right Shoulder   Flexion: 170 degrees   Abduction: 170 degrees   External rotation BTH: C2   Internal rotation BTB: T12     Strength/Myotome Testing   Cervical Spine   Neck extension: 4+  Neck flexion: 4+    Left   Neck lateral flexion (C3): 4+    Right   Neck lateral flexion (C3): 4+    Left Shoulder     Planes of Motion   Flexion: 5   Extension: 5   Abduction: 5   Adduction: 5   External rotation at 0°: 4+   Internal rotation at 0°: 5     Right Shoulder     Planes of Motion   Flexion: 5   Extension: 5   Abduction: 5   Adduction: 5   External rotation at 0°: 4+   Internal rotation at 0°: 5     Left Elbow   Flexion: 5  Extension: 5    Right Elbow   Flexion: 5  Extension: 5    Left Hip   Planes of Motion   Flexion: 4+  Extension: 5  Abduction: 4+  Adduction: 5    Right Hip   Planes of Motion   Flexion: 4+  Extension: 5  Abduction: 4+  Adduction: 5    Left Knee   Flexion: 5  Extension: 5    Right Knee   Flexion: 5  Extension: 5    Left Ankle/Foot   Dorsiflexion: 5  Plantar flexion: 5    Right Ankle/Foot   Dorsiflexion: 5  Plantar flexion: 5    Additional Strength Details  Good  strength bilaterally     Muscle Activation     Additional Muscle Activation Details  With increased cues     Tests     Lumbar     Left   Negative passive SLR.     Right   Negative passive SLR.     Left Pelvic Girdle/Sacrum   Negative: active SLR test.     Right Pelvic Girdle/Sacrum   Negative: active SLR test.     Left Hip   Negative long sit.     Right Hip   Negative long sit.     Additional Tests Details  Tightness in  "posterior chain, L>R with passive SLR     Pt able to complete PPT and LTR with cues. Tightness reported in low back with LTR, no pain  Neuro Exam:     Headaches   Patient reports headaches: Yes (sometimes).     Sensation   Light touch LE: left WNL and right WNL              Precautions: Hx cervical ACDF 8-, hx cervical myelopathy   HEP: [id number]        Manuals 2-12-25 2-17-25 2-24-25 1-27-25 2-5-25                                   Neuro Re-Ed         Scap squeeze 5\" x20 standing  5\" x20 standing  5\"x20 around roll  5\"x20 around roll  5\" x20 around roll    Foam roll up wall X15 5\"  X15 5\"  X10 3\" X15 5\" X15 5\"    Thoracic ext over roll 2 locations 5\" x10 at ea  2 locations 5\" x10 at ea  2 locations 5\"x10 at each  2 locations 5\"x10 at each  2 locations 5\" x10 at ea    LTR 10\"x10 giuseppe  10\"x10 giuseppe  10\"x10  10\"x10  10\"x10 giuseppe    PPT 5\" x20 5\" x20 5\"x20 5\"x20 5\" x20   Abd pball iso 5\" x20 5\" x20 5\"x20 5\"x20 5\" x20   Ball roll out  Fwd, R and L 5x5\" each from low mat table Fwd, R and L 5x5\" each from low mat table Fwd, R and L 5x5\" each from low mat table Fwd, R and L 5x5\" each from low mat table Fwd, R and L 5x5\" each from low mat table   Paloff Press   Dbl red 2x10 giuseppe       Ther Ex        Nustep Lv5 10' Lv5 12' Lv5 10' Lv5 10' Lv5 10'           MTP/LTP Dbl blue 3x10 ea Dbl blue 3x10 Dbl blue 3x10 each  Dbl blue 3x10 each  Dbl blue 3x10 ea   Banded pull apart        Hamstring stretch  10\"x12 giuseppe with strap  10\"x12 iguseppe with strap  10\"x12 giuseppe with strap  10\"x12 giuseppe with strap 10\"x12 giuseppe with strap    HR/TR X20 ea X20 ea X20 each way  X30 each way  X20 each way    Clamshells    Green 2x10 SL Hold     Hip add Seated 5\" x20 Seated 5\" x20 5\"x20 Seated 5\"x20  Seated 5\" x20                   Pt Edu   Updated HEP and explanations given for each progression     Ther Activity                        Gait Training                        Modalities        MHP/CP   Deferred                          "

## 2025-03-03 ENCOUNTER — APPOINTMENT (OUTPATIENT)
Dept: PHYSICAL THERAPY | Facility: CLINIC | Age: 60
End: 2025-03-03
Payer: COMMERCIAL

## 2025-03-10 ENCOUNTER — OFFICE VISIT (OUTPATIENT)
Dept: PHYSICAL THERAPY | Facility: CLINIC | Age: 60
End: 2025-03-10
Payer: COMMERCIAL

## 2025-03-10 DIAGNOSIS — M54.16 LUMBAR RADICULOPATHY: ICD-10-CM

## 2025-03-10 DIAGNOSIS — Z98.1 S/P CERVICAL SPINAL FUSION: Primary | ICD-10-CM

## 2025-03-10 PROCEDURE — 97110 THERAPEUTIC EXERCISES: CPT

## 2025-03-10 PROCEDURE — 97112 NEUROMUSCULAR REEDUCATION: CPT

## 2025-03-10 NOTE — PROGRESS NOTES
"Daily Note     Today's date: 3/10/2025  Patient name: Merlyn Velasquez  : 1965  MRN: [id number]  Referring provider: Ankit Ramos MD  Dx:   Encounter Diagnosis     ICD-10-CM    1. S/P cervical spinal fusion  Z98.1       2. Lumbar radiculopathy  M54.16                      Subjective: Pt reports that overall she seems to be feeling better. She does have a headache today. She seems to be having about 1 a week, she is following up on this with her doctor.       Objective: See treatment diary below      Assessment: Tolerated treatment well. Minimal verbal cues provided throughtout the session. Pt demonstrated good mechanics with all exercises. Continue to progress as able. Patient would benefit from continued PT      Plan: Progress treatment as tolerated.       Precautions: Hx cervical ACDF 8-, hx cervical myelopathy   HEP: [id number]        Manuals 2-12-25 2-17-25 2-24-25 3-10-25 2-5-25                                   Neuro Re-Ed         Scap squeeze 5\" x20 standing  5\" x20 standing  5\"x20 around roll  5\"x20 standing  5\" x20 around roll    Foam roll up wall X15 5\"  X15 5\"  X10 3\" X15 5\"  X15 5\"    Thoracic ext over roll 2 locations 5\" x10 at ea  2 locations 5\" x10 at ea  2 locations 5\"x10 at each  2 locations 5\"x10 at each  2 locations 5\" x10 at ea    LTR 10\"x10 giuseppe  10\"x10 giuseppe  10\"x10  10\"x10  10\"x10 giuseppe    PPT 5\" x20 5\" x20 5\"x20 5\"x20 5\" x20   Abd pball iso 5\" x20 5\" x20 5\"x20 5\"x20 5\" x20   Ball roll out  Fwd, R and L 5x5\" each from low mat table Fwd, R and L 5x5\" each from low mat table Fwd, R and L 5x5\" each from low mat table Fwd, R and L 5x5\" each from low mat table Fwd, R and L 5x5\" each from low mat table   Paloff Press   Dbl red 2x10 giuseppe       Ther Ex        Nustep Lv5 10' Lv5 12' Lv5 10' Lv5 10' Lv5 10'           MTP/LTP Dbl blue 3x10 ea Dbl blue 3x10 Dbl blue 3x10 each  MTP blue x30   LTP Blk x30     Do both blk NV  Dbl blue 3x10 ea   Banded pull apart        Hamstring stretch  10\"x12 giuseppe " "with strap  10\"x12 giuseppe with strap  10\"x12 giuseppe with strap  10\"x12 giuseppe with strap 10\"x12 giuseppe with strap    HR/TR X20 ea X20 ea X20 each way   X20 each way    Clamshells    Green 2x10 SL Green 2x10 SL    Hip add Seated 5\" x20 Seated 5\" x20 5\"x20 5\"x20 Seated 5\" x20                   Pt Edu   Updated HEP and explanations given for each progression     Ther Activity                        Gait Training                        Modalities        MHP/CP   Deferred                        "

## 2025-03-17 ENCOUNTER — OFFICE VISIT (OUTPATIENT)
Dept: PHYSICAL THERAPY | Facility: CLINIC | Age: 60
End: 2025-03-17
Payer: COMMERCIAL

## 2025-03-17 DIAGNOSIS — M54.16 LUMBAR RADICULOPATHY: ICD-10-CM

## 2025-03-17 DIAGNOSIS — Z98.1 S/P CERVICAL SPINAL FUSION: Primary | ICD-10-CM

## 2025-03-17 PROCEDURE — 97110 THERAPEUTIC EXERCISES: CPT

## 2025-03-17 PROCEDURE — 97112 NEUROMUSCULAR REEDUCATION: CPT

## 2025-03-17 NOTE — PROGRESS NOTES
"Daily Note     Today's date: 3/17/2025  Patient name: Merlyn Velasquez  : 1965  MRN: [id number]  Referring provider: Ankit Ramos MD  Dx:   Encounter Diagnosis     ICD-10-CM    1. S/P cervical spinal fusion  Z98.1       2. Lumbar radiculopathy  M54.16                      Subjective: Pt reports that she is doing well, no headache. She reports compliance with ADLs.       Objective: See treatment diary below      Assessment: Tolerated treatment well. Slight progressions added to webslide resistance. Pt continues to demonstrate improved exercise tolerance. She reported to feel good post session. Patient would benefit from continued PT      Plan: Progress treatment as tolerated.       Precautions: Hx cervical ACDF 8-, hx cervical myelopathy   HEP: [id number]        Manuals 2-12-25 2-17-25 2-24-25 3-10-25 3-17-25                                   Neuro Re-Ed         Scap squeeze 5\" x20 standing  5\" x20 standing  5\"x20 around roll  5\"x20 standing  5\"x20   Foam roll up wall X15 5\"  X15 5\"  X10 3\" X15 5\"  X20   Thoracic ext over roll 2 locations 5\" x10 at ea  2 locations 5\" x10 at ea  2 locations 5\"x10 at each  2 locations 5\"x10 at each     LTR 10\"x10 giuseppe  10\"x10 giuseppe  10\"x10  10\"x10  10\"x10   PPT 5\" x20 5\" x20 5\"x20 5\"x20 5\"x20   Abd pball iso 5\" x20 5\" x20 5\"x20 5\"x20    Ball roll out  Fwd, R and L 5x5\" each from low mat table Fwd, R and L 5x5\" each from low mat table Fwd, R and L 5x5\" each from low mat table Fwd, R and L 5x5\" each from low mat table Fwd, R and L 5x5\" each from low mat table   Paloff Press   Dbl red 2x10 giuseppe       Ther Ex        Nustep Lv5 10' Lv5 12' Lv5 10' Lv5 10' Lv5 10'            MTP/LTP Dbl blue 3x10 ea Dbl blue 3x10 Dbl blue 3x10 each  MTP blue x30   LTP Blk x30     Do both blk NV  Dbl blk 3x10 each    Banded pull apart        Hamstring stretch  10\"x12 giuseppe with strap  10\"x12 giuseppe with strap  10\"x12 giuseppe with strap  10\"x12 giuseppe with strap 10\"x12 giuseppe with strap   HR/TR X20 ea X20 ea X20 " "each way      Clamshells    Green 2x10 SL Green 2x10 SL Blue SL 3x10 giuseppe    Hip add Seated 5\" x20 Seated 5\" x20 5\"x20 5\"x20 5\"x20 seated                    Pt Edu   Updated HEP and explanations given for each progression     Ther Activity                        Gait Training                        Modalities        MHP/CP   Deferred                             "

## 2025-03-24 ENCOUNTER — OFFICE VISIT (OUTPATIENT)
Dept: PHYSICAL THERAPY | Facility: CLINIC | Age: 60
End: 2025-03-24
Payer: COMMERCIAL

## 2025-03-24 DIAGNOSIS — Z98.1 S/P CERVICAL SPINAL FUSION: Primary | ICD-10-CM

## 2025-03-24 DIAGNOSIS — M54.16 LUMBAR RADICULOPATHY: ICD-10-CM

## 2025-03-24 PROCEDURE — 97112 NEUROMUSCULAR REEDUCATION: CPT

## 2025-03-24 PROCEDURE — 97110 THERAPEUTIC EXERCISES: CPT

## 2025-03-24 NOTE — PROGRESS NOTES
"Daily Note     Today's date: 3/24/2025  Patient name: Merlyn Velasquez  : 1965  MRN: [id number]  Referring provider: Ankit Ramos MD  Dx:   Encounter Diagnosis     ICD-10-CM    1. S/P cervical spinal fusion  Z98.1       2. Lumbar radiculopathy  M54.16                      Subjective: Pt reports that her neck is feeling good today but her back is a little sore.       Objective: See treatment diary below      Assessment: Tolerated treatment well. No pain reported with any exercises this session. Pt demonstrated good mechanics with minimal verbal cues needed. Progress as able. Patient would benefit from continued PT      Plan: Progress treatment as tolerated.       Precautions: Hx cervical ACDF 8-, hx cervical myelopathy   HEP: [id number]        Manuals 3-24-25 2-17-25 2-24-25 3-10-25 3-17-25                                   Neuro Re-Ed         Scap squeeze 5\"x20 5\" x20 standing  5\"x20 around roll  5\"x20 standing  5\"x20   Foam roll up wall X20 X15 5\"  X10 3\" X15 5\"  X20   Thoracic ext over roll 2 locations 5\"x10 at each  2 locations 5\" x10 at ea  2 locations 5\"x10 at each  2 locations 5\"x10 at each     LTR 10\"x10 10\"x10 giuseppe  10\"x10  10\"x10  10\"x10   PPT 5\"x20 5\" x20 5\"x20 5\"x20 5\"x20   Abd pball iso  5\" x20 5\"x20 5\"x20    Ball roll out  Fwd, R and L 5x5\" each from low mat table Fwd, R and L 5x5\" each from low mat table Fwd, R and L 5x5\" each from low mat table Fwd, R and L 5x5\" each from low mat table Fwd, R and L 5x5\" each from low mat table   Paloff Press   Dbl red 2x10 giuseppe       Ther Ex        Nustep Lv8 10' Lv5 12' Lv5 10' Lv5 10' Lv5 10'            MTP/LTP Dbl blk 3x10 each  Dbl blue 3x10 Dbl blue 3x10 each  MTP blue x30   LTP Blk x30     Do both blk NV  Dbl blk 3x10 each    Banded pull apart        Hamstring stretch  10\"x12 giuseppe with strap 10\"x12 giuseppe with strap  10\"x12 giuseppe with strap  10\"x12 giuseppe with strap 10\"x12 giuseppe with strap   HR/TR  X20 ea X20 each way      Clamshells  Blue SL 3x10   Green 2x10 " "SL Green 2x10 SL Blue SL 3x10 giuseppe    Hip add 5\"x20 seated  Seated 5\" x20 5\"x20 5\"x20 5\"x20 seated                    Pt Edu   Updated HEP and explanations given for each progression     Ther Activity                        Gait Training                        Modalities        MHP/CP   Deferred                               "

## 2025-03-31 ENCOUNTER — OFFICE VISIT (OUTPATIENT)
Dept: PHYSICAL THERAPY | Facility: CLINIC | Age: 60
End: 2025-03-31
Payer: COMMERCIAL

## 2025-03-31 DIAGNOSIS — M54.16 LUMBAR RADICULOPATHY: ICD-10-CM

## 2025-03-31 DIAGNOSIS — Z98.1 S/P CERVICAL SPINAL FUSION: Primary | ICD-10-CM

## 2025-03-31 PROCEDURE — 97112 NEUROMUSCULAR REEDUCATION: CPT

## 2025-03-31 PROCEDURE — 97110 THERAPEUTIC EXERCISES: CPT

## 2025-04-07 ENCOUNTER — OFFICE VISIT (OUTPATIENT)
Dept: PHYSICAL THERAPY | Facility: CLINIC | Age: 60
End: 2025-04-07
Payer: COMMERCIAL

## 2025-04-07 DIAGNOSIS — M54.16 LUMBAR RADICULOPATHY: ICD-10-CM

## 2025-04-07 DIAGNOSIS — Z98.1 S/P CERVICAL SPINAL FUSION: Primary | ICD-10-CM

## 2025-04-07 PROCEDURE — 97110 THERAPEUTIC EXERCISES: CPT

## 2025-04-07 PROCEDURE — 97112 NEUROMUSCULAR REEDUCATION: CPT

## 2025-04-07 NOTE — PROGRESS NOTES
"Daily Note     Today's date: 2025  Patient name: Merlyn Velasquez  : 1965  MRN: [id number]  Referring provider: Ankit Ramos MD  Dx:   Encounter Diagnosis     ICD-10-CM    1. S/P cervical spinal fusion  Z98.1       2. Lumbar radiculopathy  M54.16                      Subjective: Pt reports to be doing okay and offers no complaints.       Objective: See treatment diary below      Assessment: Tolerated treatment well. Pt tolerated exercises well today. Progressed band color for clamshells today. Minimal verbal cues provided to ensure proper mechanics. Pt continues to improve. Pt will be leaving this weekend for [place]. Assess symptoms and progress NV, recommended to continue with HEP while away. Patient would benefit from continued PT      Plan: Progress treatment as tolerated.       Precautions: Hx cervical ACDF 8-, hx cervical myelopathy   HEP: [id number]        Manuals 3-24-25 3-31-25 4-7-25 3-10-25 3-17-25                                   Neuro Re-Ed         Scap squeeze 5\"x20   5\"x20 standing  5\"x20   Foam roll up wall X20 X20 5\"  X20 5\"  X15 5\"  X20   Thoracic ext over roll 2 locations 5\"x10 at each  2 locations 5\"x10 at each  2 locations 5\"x10 at each  2 locations 5\"x10 at each     LTR 10\"x10 10\"x10 giuseppe  10\"x10 giuseppe  10\"x10  10\"x10   PPT 5\"x20 5\" x20 5\" x20 5\"x20 5\"x20   Abd pball iso  5\" x20 5\" x20 5\"x20    Ball roll out  Fwd, R and L 5x5\" each from low mat table Fwd, R and L 5x5\" each from low mat table Fwd, R and L 5x5\" each from low mat table Fwd, R and L 5x5\" each from low mat table Fwd, R and L 5x5\" each from low mat table   Paloff Press   Dbl green x20 giuseppe  Dbl green 2x15 giuseppe      Ther Ex        Nustep Lv8 10' Lv5 12' Lv5 12' Lv5 10' Lv5 10'            MTP/LTP Dbl blk 3x10 each  Dbl blk 3x10 Dbl blk 3x10 MTP blue x30   LTP Blk x30     Do both blk NV  Dbl blk 3x10 each    Banded pull apart        Hamstring stretch  10\"x12 giuseppe with strap 10\"x12 giuseppe with strap  10\"x12 giuseppe with strap  10\"x12 " "giuseppe with strap 10\"x12 giuseppe with strap   HR/TR        Clamshells  Blue SL 3x10  Blue SL 3x10  Blk SL 3x10 giuseppe Green 2x10 SL Blue SL 3x10 giuseppe    Hip add 5\"x20 seated  Seated 5\" x30 Seated 5\" x30 5\"x20 5\"x20 seated                    Pt Edu        Ther Activity                        Gait Training                        Modalities        MHP/CP                                     "

## 2025-08-18 ENCOUNTER — EVALUATION (OUTPATIENT)
Dept: PHYSICAL THERAPY | Facility: CLINIC | Age: 60
End: 2025-08-18
Payer: COMMERCIAL

## 2025-08-18 DIAGNOSIS — M79.604 BILATERAL LEG PAIN: ICD-10-CM

## 2025-08-18 DIAGNOSIS — M79.672 LEFT FOOT PAIN: Primary | ICD-10-CM

## 2025-08-18 DIAGNOSIS — M79.605 BILATERAL LEG PAIN: ICD-10-CM

## 2025-08-18 DIAGNOSIS — M79.671 RIGHT FOOT PAIN: ICD-10-CM

## 2025-08-18 PROCEDURE — 97162 PT EVAL MOD COMPLEX 30 MIN: CPT

## 2025-08-18 PROCEDURE — 97110 THERAPEUTIC EXERCISES: CPT

## 2025-08-20 ENCOUNTER — OFFICE VISIT (OUTPATIENT)
Dept: PHYSICAL THERAPY | Facility: CLINIC | Age: 60
End: 2025-08-20
Attending: PHYSICIAN ASSISTANT
Payer: COMMERCIAL

## 2025-08-20 DIAGNOSIS — M79.605 BILATERAL LEG PAIN: ICD-10-CM

## 2025-08-20 DIAGNOSIS — M79.672 LEFT FOOT PAIN: Primary | ICD-10-CM

## 2025-08-20 DIAGNOSIS — M79.604 BILATERAL LEG PAIN: ICD-10-CM

## 2025-08-20 DIAGNOSIS — M79.671 RIGHT FOOT PAIN: ICD-10-CM

## 2025-08-20 PROCEDURE — 97112 NEUROMUSCULAR REEDUCATION: CPT

## 2025-08-20 PROCEDURE — 97110 THERAPEUTIC EXERCISES: CPT
